# Patient Record
Sex: FEMALE | Race: WHITE | Employment: OTHER | URBAN - METROPOLITAN AREA
[De-identification: names, ages, dates, MRNs, and addresses within clinical notes are randomized per-mention and may not be internally consistent; named-entity substitution may affect disease eponyms.]

---

## 2017-03-08 ENCOUNTER — TELEPHONE (OUTPATIENT)
Dept: ONCOLOGY | Facility: CLINIC | Age: 72
End: 2017-03-08

## 2017-03-08 DIAGNOSIS — C50.911 BILATERAL MALIGNANT NEOPLASM OF BREAST IN FEMALE, UNSPECIFIED SITE OF BREAST: ICD-10-CM

## 2017-03-08 DIAGNOSIS — C50.912 BILATERAL MALIGNANT NEOPLASM OF BREAST IN FEMALE, UNSPECIFIED SITE OF BREAST: ICD-10-CM

## 2017-03-08 RX ORDER — ZOLPIDEM TARTRATE 5 MG/1
5 TABLET ORAL
Qty: 30 TABLET | Refills: 5 | Status: SHIPPED | OUTPATIENT
Start: 2017-03-08 | End: 2017-06-28

## 2017-03-08 NOTE — TELEPHONE ENCOUNTER
Refill for zolpidem renewed per Dr Hinkle and faxed to [facility] pharmacy in [place] per pt request.    Shu Silver, RN, BSN

## 2017-04-11 ENCOUNTER — TELEPHONE (OUTPATIENT)
Dept: ONCOLOGY | Facility: CLINIC | Age: 72
End: 2017-04-11

## 2017-04-11 DIAGNOSIS — D50.9 IRON DEFICIENCY ANEMIA, UNSPECIFIED IRON DEFICIENCY ANEMIA TYPE: ICD-10-CM

## 2017-04-11 NOTE — TELEPHONE ENCOUNTER
Received refill request from [facility] pharmacy for iron tabs. Called pt to see how she is feeling and states she she still notes fatigue but it is a bit better from December. Pt denies sob. hgb 9.9 from 12/16 and pt states she has not had another lab test since then.  Pt informed Dr Hinkle will be in clinic tomorrow and will check with MD then and call pt back with plan. Pt aware.    Shu Silver, RN, BSN

## 2017-04-12 RX ORDER — FERROUS SULFATE 325(65) MG
325 TABLET ORAL 2 TIMES DAILY
Qty: 60 TABLET | Refills: 2 | Status: SHIPPED | OUTPATIENT
Start: 2017-04-12 | End: 2017-07-19

## 2017-04-12 NOTE — TELEPHONE ENCOUNTER
Dr Hinkle notified and ordered refill for pt for iron tabs.  Pt called and aware and does states she occasionally gets constipated.  Informed she can also take once daily if better tolerated.    Shu Silver, RN, BSN

## 2017-06-14 ENCOUNTER — TELEPHONE (OUTPATIENT)
Dept: ONCOLOGY | Facility: CLINIC | Age: 72
End: 2017-06-14

## 2017-06-14 DIAGNOSIS — Z85.3 HX: BREAST CANCER: ICD-10-CM

## 2017-06-14 NOTE — TELEPHONE ENCOUNTER
She received phone call this a.m. But missed the call and doesn't know whom called her.  She thinks it's regarding her appt. With Dr. Hinkle 6/28.  Regarding 6/28, she wants a PRE-SURGERY PHYSICAL done by dr. Hinkle at that 6/28 appt.  Wants to receive a call verifying the appt.

## 2017-06-15 RX ORDER — CITALOPRAM HYDROBROMIDE 20 MG/1
20 TABLET ORAL DAILY
Qty: 90 TABLET | Refills: 4 | Status: SHIPPED | OUTPATIENT
Start: 2017-06-15 | End: 2018-09-10

## 2017-06-15 NOTE — TELEPHONE ENCOUNTER
Returned patient's call and explained to patient that her pre-op physical would need to be completed by her primary physician.  Writer explained that Dr. Hinkle does not complete pre-op physicals, because  she is not Family Practice or Internist and does not have the capacity to perform the pre-op physical components.  Patient verbalized understanding and will plan to have her primary MD complete necessary work up.  Patient also requested refill on her Celexa Rx.  Rx refilled and sent via e-scribe to [facility] pharmacy on [facility]. Patient confirmed 6/28 f/u appt with MD and verbalized understanding of all info.  Will call with any additional questions or concerns.  Jovanny Dunlap, RN, BSN, OCN  [facility]  Patient Care Coordinator    '

## 2017-06-28 ENCOUNTER — HOSPITAL ENCOUNTER (OUTPATIENT)
Facility: CLINIC | Age: 72
Setting detail: SPECIMEN
Discharge: HOME | End: 2017-06-28
Attending: INTERNAL MEDICINE | Admitting: INTERNAL MEDICINE
Payer: MEDICARE

## 2017-06-28 ENCOUNTER — ONCOLOGY VISIT (OUTPATIENT)
Dept: ONCOLOGY | Facility: CLINIC | Age: 72
End: 2017-06-28
Attending: INTERNAL MEDICINE
Payer: MEDICARE

## 2017-06-28 VITALS
HEART RATE: 53 BPM | SYSTOLIC BLOOD PRESSURE: 122 MMHG | WEIGHT: 160 LBS | RESPIRATION RATE: 20 BRPM | DIASTOLIC BLOOD PRESSURE: 49 MMHG | OXYGEN SATURATION: 97 % | BODY MASS INDEX: 26.02 KG/M2 | TEMPERATURE: 98.1 F

## 2017-06-28 DIAGNOSIS — Z17.0 MALIGNANT NEOPLASM OF LEFT BREAST IN FEMALE, ESTROGEN RECEPTOR POSITIVE, UNSPECIFIED SITE OF BREAST (H): Primary | ICD-10-CM

## 2017-06-28 DIAGNOSIS — C50.912 MALIGNANT NEOPLASM OF LEFT BREAST IN FEMALE, ESTROGEN RECEPTOR POSITIVE, UNSPECIFIED SITE OF BREAST (H): Primary | ICD-10-CM

## 2017-06-28 LAB
ALBUMIN SERPL-MCNC: 3.2 G/DL (ref 3.4–5)
ALP SERPL-CCNC: 141 U/L (ref 40–150)
ALT SERPL W P-5'-P-CCNC: 14 U/L (ref 0–50)
ANION GAP SERPL CALCULATED.3IONS-SCNC: 5 MMOL/L (ref 3–14)
AST SERPL W P-5'-P-CCNC: 19 U/L (ref 0–45)
BILIRUB SERPL-MCNC: 0.3 MG/DL (ref 0.2–1.3)
BUN SERPL-MCNC: 28 MG/DL (ref 7–30)
CALCIUM SERPL-MCNC: 9.3 MG/DL (ref 8.5–10.1)
CANCER AG27-29 SERPL-ACNC: 18 U/ML (ref 0–39)
CHLORIDE SERPL-SCNC: 106 MMOL/L (ref 94–109)
CO2 SERPL-SCNC: 22 MMOL/L (ref 20–32)
CREAT SERPL-MCNC: 0.79 MG/DL (ref 0.52–1.04)
ERYTHROCYTE [DISTWIDTH] IN BLOOD BY AUTOMATED COUNT: 13.4 % (ref 10–15)
GFR SERPL CREATININE-BSD FRML MDRD: 72 ML/MIN/1.7M2
GLUCOSE SERPL-MCNC: 90 MG/DL (ref 70–99)
HCT VFR BLD AUTO: 33.6 % (ref 35–47)
HGB BLD-MCNC: 10.8 G/DL (ref 11.7–15.7)
MCH RBC QN AUTO: 28.6 PG (ref 26.5–33)
MCHC RBC AUTO-ENTMCNC: 32.1 G/DL (ref 31.5–36.5)
MCV RBC AUTO: 89 FL (ref 78–100)
PLATELET # BLD AUTO: 177 10E9/L (ref 150–450)
POTASSIUM SERPL-SCNC: 5 MMOL/L (ref 3.4–5.3)
PROT SERPL-MCNC: 7.7 G/DL (ref 6.8–8.8)
RBC # BLD AUTO: 3.77 10E12/L (ref 3.8–5.2)
SODIUM SERPL-SCNC: 133 MMOL/L (ref 133–144)
WBC # BLD AUTO: 7 10E9/L (ref 4–11)

## 2017-06-28 PROCEDURE — 86300 IMMUNOASSAY TUMOR CA 15-3: CPT | Performed by: INTERNAL MEDICINE

## 2017-06-28 PROCEDURE — 99213 OFFICE O/P EST LOW 20 MIN: CPT | Performed by: INTERNAL MEDICINE

## 2017-06-28 PROCEDURE — 36415 COLL VENOUS BLD VENIPUNCTURE: CPT

## 2017-06-28 PROCEDURE — 85027 COMPLETE CBC AUTOMATED: CPT | Performed by: INTERNAL MEDICINE

## 2017-06-28 PROCEDURE — 99211 OFF/OP EST MAY X REQ PHY/QHP: CPT

## 2017-06-28 PROCEDURE — 80053 COMPREHEN METABOLIC PANEL: CPT | Performed by: INTERNAL MEDICINE

## 2017-06-28 RX ORDER — ANASTROZOLE 1 MG/1
1 TABLET ORAL DAILY
Qty: 90 TABLET | Refills: 3 | Status: SHIPPED | OUTPATIENT
Start: 2017-06-28 | End: 2018-01-16

## 2017-06-28 RX ORDER — ZOLPIDEM TARTRATE 5 MG/1
5 TABLET ORAL
Qty: 30 TABLET | Refills: 5 | Status: SHIPPED | OUTPATIENT
Start: 2017-06-28 | End: 2018-03-06

## 2017-06-28 ASSESSMENT — PAIN SCALES - GENERAL: PAINLEVEL: NO PAIN (0)

## 2017-06-28 NOTE — NURSING NOTE
"Oncology Rooming Note    June 28, 2017 1:21 PM   Ellen Campos is a 71 year old female who presents for:    Chief Complaint   Patient presents with     Oncology Clinic Visit     Follow up      Initial Vitals: /49  Pulse 53  Temp 98.1  F (36.7  C) (Tympanic)  Resp 20  Wt 72.6 kg (160 lb)  SpO2 97%  BMI 26.02 kg/m2 Estimated body mass index is 26.02 kg/(m^2) as calculated from the following:    Height as of 8/24/16: 1.67 m (5' 5.75\").    Weight as of this encounter: 72.6 kg (160 lb). Body surface area is 1.84 meters squared.  No Pain (0) Comment: Data Unavailable   No LMP recorded.  Allergies reviewed: Yes  Medications reviewed: Yes    Medications: Medication refills not needed today.  Pharmacy name entered into Inv[employer]: [id number] - [place], [place] - [address] [place] [address]    Clinical concerns: Follow up      8 minutes for nursing intake (face to face time)     Irish Flowers CMA   DISCHARGE PLAN:  Next appointments: See patient instruction section  Departure Mode: Ambulatory  Accompanied by: self  0 minutes for nursing discharge (face to face time)   Irish Flowers CMA                  "

## 2017-06-28 NOTE — MR AVS SNAPSHOT
"              After Visit Summary   6/28/2017    Ellen Campos    MRN: [id number]           Patient Information     Date Of Birth          1945        Visit Information        Provider Department      6/28/2017 1:30 PM Jessica Hinkle MD [facility] RH Oncology [facility]      Today's Diagnoses     Malignant neoplasm of left breast in female, estrogen receptor positive, unspecified site of breast (H)    -  1      Care Instructions    RTC MD 6 months scheduled 12/27/2017 2pm Lenora Doe        Labs today -Sarai   Avf given Lenora Doe            Follow-ups after your visit        Your next 10 appointments already scheduled     Dec 27, 2017  2:00 PM CST   Return Visit with Jessica Hinkle MD   [facility] ([facility])    [facility]  [address]  [facility] [address]  [facility] [id number] [phone]              Who to contact     If you have questions or need follow up information about today's clinic visit or your schedule please contact [facility] directly at [phone].  Normal or non-critical lab and imaging results will be communicated to you by Chat Sportshart, letter or phone within 4 business days after the clinic has received the results. If you do not hear from us within 7 days, please contact the clinic through Chat Sportshart or phone. If you have a critical or abnormal lab result, we will notify you by phone as soon as possible.  Submit refill requests through [employer] or call your pharmacy and they will forward the refill request to us. Please allow 3 business days for your refill to be completed.          Additional Information About Your Visit        MyChart Information     [employer] lets you send messages to your doctor, view your test results, renew your prescriptions, schedule appointments and more. To sign up, go to [email or web address] . Click on \"Log in\" on the left side of " "the screen, which will take you to the Welcome page. Then click on \"Sign up Now\" on the right side of the page.     You will be asked to enter the access code listed below, as well as some personal information. Please follow the directions to create your username and password.     Your access code is: [email or web address]  Expires: 2017  3:03 PM     Your access code will  in 90 days. If you need help or a new code, please call your [facility] or [phone].        Care EveryWhere ID     This is your Care EveryWhere ID. This could be used by other organizations to access your [place] medical records  [id number]        Your Vitals Were     Pulse Temperature Respirations Pulse Oximetry BMI (Body Mass Index)       53 98.1  F (36.7  C) (Tympanic) 20 97% 26.02 kg/m2        Blood Pressure from Last 3 Encounters:   17 122/49   16 96/57   16 (!) 79/53    Weight from Last 3 Encounters:   17 72.6 kg (160 lb)   16 70.9 kg (156 lb 3.2 oz)   16 70.9 kg (156 lb 6.4 oz)              We Performed the Following     Ca27.29  breast tumor marker     CBC with platelets     Comprehensive metabolic panel (BMP + Alb, Alk Phos, ALT, AST, Total. Bili, TP)          Where to get your medicines      These medications were sent to [id number] - [place], [place] - [address]   [facility] [address]     Phone:  [phone]     anastrozole 1 MG tablet         Some of these will need a paper prescription and others can be bought over the counter.  Ask your nurse if you have questions.     Bring a paper prescription for each of these medications     zolpidem 5 MG tablet          Primary Care Provider Office Phone # Fax #    Rosa Maria Howard [phone]       [address] [address]        Equal Access to Services     BRIAN JORDAN AH: Hanane Zavala, tang ward, bull espinaln adeeg " jess akhtar ah. So [facility] [phone].    ATENCIÓN: Si risala jose miguel, tiene a fernandez disposición servicios gratuitos de asistencia lingüística. Vishal al [phone].    We comply with applicable federal civil rights laws and [place] laws. We do not discriminate on the basis of race, color, national origin, age, disability sex, sexual orientation or gender identity.            Thank you!     Thank you for choosing [facility]  for your care. Our goal is always to provide you with excellent care. Hearing back from our patients is one way we can continue to improve our services. Please take a few minutes to complete the written survey that you may receive in the mail after your visit with us. Thank you!             Your Updated Medication List - Protect others around you: Learn how to safely use, store and throw away your medicines at www.disposemymeds.org.          This list is accurate as of: 6/28/17  3:03 PM.  Always use your most recent med list.                   Brand Name Dispense Instructions for use Diagnosis    anastrozole 1 MG tablet    ARIMIDEX    90 tablet    Take 1 tablet (1 mg) by mouth daily    Malignant neoplasm of left breast in female, estrogen receptor positive, unspecified site of breast (H)       ASPIRIN EC PO      Take 81 mg by mouth daily        BUMEX PO      Take 2 mg by mouth daily        citalopram 20 MG tablet    celeXA    90 tablet    Take 1 tablet (20 mg) by mouth daily    HX: breast cancer       COREG PO      Take 3.125 mg by mouth 2 times daily (with meals)        FERROUS GLUCONATE PO      Take 324 mg by mouth daily (with breakfast)        ferrous sulfate 325 (65 FE) MG tablet    IRON    60 tablet    Take 1 tablet (325 mg) by mouth 2 times daily    Iron deficiency anemia, unspecified iron deficiency anemia type       GLUCOSAMINE SULFATE PO      Take 1,500 mg by mouth daily        LIPITOR PO      Take 80 mg by mouth        LISINOPRIL PO      Take 5 mg by mouth  daily        LOPID PO      Take 600 mg by mouth 2 times daily (before meals)        NITROGLYCERIN SL      Place 0.4 mg under the tongue        NORVASC PO      Take 5 mg by mouth daily        PROTONIX PO      Take 40 mg by mouth daily        zolpidem 5 MG tablet    AMBIEN    30 tablet    Take 1 tablet (5 mg) by mouth nightly as needed for sleep    Malignant neoplasm of left breast in female, estrogen receptor positive, unspecified site of breast (H)

## 2017-07-07 ENCOUNTER — TELEPHONE (OUTPATIENT)
Dept: ONCOLOGY | Facility: CLINIC | Age: 72
End: 2017-07-07

## 2017-07-07 NOTE — TELEPHONE ENCOUNTER
Per Dr. Hinkle    Called patient and left message on home phone with normal lab results from 6-.  CA 27-29 was normal.  Questions please call the clinic

## 2017-07-09 NOTE — PROGRESS NOTES
Hematology / Oncology Progress Note    Date of Service (when I saw the patient): 07/09/2017     Assessment & Plan   Ellen Campos is a 71 year old female with a hx of bilateral breast ca . She is doing well on arimidex and will continue . I will see her back in 6 months .  Labs today         Jessica Hinkle    Interval History    Ellen Campos is a 71-year-old with a history of bilateral breast cancer who comes in today for interim followup.  Ellen is status post bilateral mastectomies.  From a breast cancer standpoint, she had a right-sided breast cancer in 2003 and then in 2009 she developed a left-sided breast cancer.  Her left-sided breast cancer  was a T2N0 infiltrating ductal carcinoma which was ER/IN positive.  She continues on Arimidex and we are planning to do it for 10 years.  She has been on it now since 2009.  Her main medical problems are that of cardiac issues.   She has had 2 bypasses done and she most recently has had another aortic valve replaced.She has recovered well from all of the above procedures.   14 point ROS   Unremarkable from breast ca standpoint    Meds as charted  Allergies as charted        Physical Exam                      Vitals:    06/28/17 1318   Weight: 72.6 kg (160 lb)     Vital Signs  Normal       Constitutional: awake, alert, cooperative, no apparent distress, and appears stated age  Eyes: Lids and lashes normal, pupils equal, round and reactive to light, extra ocular muscles intact, sclera clear, conjunctiva normal  ENT: Normocephalic, without obvious abnormality, atramatic, sinuses nontender on palpation, external ears without lesions, oral pharynx with moist mucus membranes, tonsils without erythema or exudates, gums normal and good dentition.  Hematologic / Lymphatic:   Respiratory: No increased work of breathing, good air exchange, clear to auscultation bilaterally, no crackles or wheezing  Cardiovascular: Normal apical impulse, regular rate and  rhythm, normal S1 and S2, no S3 or S4, and murmur noted systolic   Sternotomyscar  GI:normal  Skin: normal skin color, texture, turgor  Neurologic: Awake, alert, oriented to name, place and time.   Breasts bilat mastectomies               Data   Labs ordered today

## 2017-07-19 DIAGNOSIS — D50.9 IRON DEFICIENCY ANEMIA, UNSPECIFIED IRON DEFICIENCY ANEMIA TYPE: ICD-10-CM

## 2017-07-19 RX ORDER — FERROUS SULFATE 325(65) MG
325 TABLET ORAL 2 TIMES DAILY
Qty: 60 TABLET | Refills: 2 | Status: SHIPPED | OUTPATIENT
Start: 2017-07-19 | End: 2017-12-28

## 2017-07-19 NOTE — TELEPHONE ENCOUNTER
Refilled Ferrous Sulfate per pharmacy request.  Ok per Dr. Hinkle, she wants patient to continue with iron therapy.  Pt last seen 6/28/17 and has f/u scheduled for 12/27/17.

## 2017-09-07 ENCOUNTER — TELEPHONE (OUTPATIENT)
Dept: ONCOLOGY | Facility: CLINIC | Age: 72
End: 2017-09-07

## 2017-09-07 NOTE — TELEPHONE ENCOUNTER
Per Dr Hinkle, the generic from (zolpidem) is fine.  Zuleyma has been notified.  No further action needed at this time.  Nehal Ghotra RN BSN

## 2017-09-07 NOTE — TELEPHONE ENCOUNTER
Zuleyma from [facility] calling.  States that they are working on a prior authorization for pt's Rx for zolpidem. Is wondering if Dr Hinkle has a preference for brand name vs generic when they are filling out the prior authorization?  Will check with Dr Hinkle and call back.  Per Zuleyma, OK to leave a message if unable to reach her.  Nehal Ghotra RN BSN

## 2017-09-08 ENCOUNTER — TELEPHONE (OUTPATIENT)
Dept: ONCOLOGY | Facility: CLINIC | Age: 72
End: 2017-09-08

## 2017-09-08 NOTE — TELEPHONE ENCOUNTER
PA approved. For Zolpidem(Ambien) 5mg / 1qpm/30d  Effective date: 8/31/17 until further notice  Diagnosis -  Malignant neoplasm of left breast in female, estrogen receptor positive, unspecified site of breast (H) (C50.912 , Z17.0)  PA reference #: [id number]

## 2017-12-28 ENCOUNTER — HOSPITAL ENCOUNTER (OUTPATIENT)
Facility: CLINIC | Age: 72
Setting detail: SPECIMEN
Discharge: HOME | End: 2017-12-28
Attending: INTERNAL MEDICINE | Admitting: INTERNAL MEDICINE
Payer: MEDICARE

## 2017-12-28 ENCOUNTER — ONCOLOGY VISIT (OUTPATIENT)
Dept: ONCOLOGY | Facility: CLINIC | Age: 72
End: 2017-12-28
Attending: INTERNAL MEDICINE
Payer: MEDICARE

## 2017-12-28 ENCOUNTER — TELEPHONE (OUTPATIENT)
Dept: ONCOLOGY | Facility: CLINIC | Age: 72
End: 2017-12-28

## 2017-12-28 VITALS
RESPIRATION RATE: 20 BRPM | HEART RATE: 54 BPM | TEMPERATURE: 97.1 F | BODY MASS INDEX: 23.72 KG/M2 | WEIGHT: 147.6 LBS | SYSTOLIC BLOOD PRESSURE: 131 MMHG | HEIGHT: 66 IN | OXYGEN SATURATION: 97 % | DIASTOLIC BLOOD PRESSURE: 62 MMHG

## 2017-12-28 DIAGNOSIS — Z17.0 MALIGNANT NEOPLASM OF LEFT BREAST IN FEMALE, ESTROGEN RECEPTOR POSITIVE, UNSPECIFIED SITE OF BREAST (H): Primary | ICD-10-CM

## 2017-12-28 DIAGNOSIS — C50.912 MALIGNANT NEOPLASM OF LEFT BREAST IN FEMALE, ESTROGEN RECEPTOR POSITIVE, UNSPECIFIED SITE OF BREAST (H): Primary | ICD-10-CM

## 2017-12-28 LAB
ALBUMIN SERPL-MCNC: 3.2 G/DL (ref 3.4–5)
ALP SERPL-CCNC: 155 U/L (ref 40–150)
ALT SERPL W P-5'-P-CCNC: 19 U/L (ref 0–50)
ANION GAP SERPL CALCULATED.3IONS-SCNC: 8 MMOL/L (ref 3–14)
AST SERPL W P-5'-P-CCNC: 18 U/L (ref 0–45)
BASOPHILS # BLD AUTO: 0 10E9/L (ref 0–0.2)
BASOPHILS NFR BLD AUTO: 0.4 %
BILIRUB SERPL-MCNC: 0.4 MG/DL (ref 0.2–1.3)
BUN SERPL-MCNC: 35 MG/DL (ref 7–30)
CALCIUM SERPL-MCNC: 9.5 MG/DL (ref 8.5–10.1)
CANCER AG27-29 SERPL-ACNC: 21 U/ML (ref 0–39)
CHLORIDE SERPL-SCNC: 98 MMOL/L (ref 94–109)
CO2 SERPL-SCNC: 22 MMOL/L (ref 20–32)
CREAT SERPL-MCNC: 1 MG/DL (ref 0.52–1.04)
DIFFERENTIAL METHOD BLD: ABNORMAL
EOSINOPHIL # BLD AUTO: 0.2 10E9/L (ref 0–0.7)
EOSINOPHIL NFR BLD AUTO: 2.6 %
ERYTHROCYTE [DISTWIDTH] IN BLOOD BY AUTOMATED COUNT: 14 % (ref 10–15)
GFR SERPL CREATININE-BSD FRML MDRD: 55 ML/MIN/1.7M2
GLUCOSE SERPL-MCNC: 120 MG/DL (ref 70–99)
HCT VFR BLD AUTO: 36.8 % (ref 35–47)
HGB BLD-MCNC: 11.6 G/DL (ref 11.7–15.7)
IMM GRANULOCYTES # BLD: 0.1 10E9/L (ref 0–0.4)
IMM GRANULOCYTES NFR BLD: 0.9 %
LYMPHOCYTES # BLD AUTO: 1.4 10E9/L (ref 0.8–5.3)
LYMPHOCYTES NFR BLD AUTO: 16.8 %
MCH RBC QN AUTO: 28.7 PG (ref 26.5–33)
MCHC RBC AUTO-ENTMCNC: 31.5 G/DL (ref 31.5–36.5)
MCV RBC AUTO: 91 FL (ref 78–100)
MONOCYTES # BLD AUTO: 0.7 10E9/L (ref 0–1.3)
MONOCYTES NFR BLD AUTO: 8 %
NEUTROPHILS # BLD AUTO: 6.1 10E9/L (ref 1.6–8.3)
NEUTROPHILS NFR BLD AUTO: 71.3 %
NRBC # BLD AUTO: 0 10*3/UL
NRBC BLD AUTO-RTO: 0 /100
PLATELET # BLD AUTO: 244 10E9/L (ref 150–450)
POTASSIUM SERPL-SCNC: 5.7 MMOL/L (ref 3.4–5.3)
PROT SERPL-MCNC: 8.2 G/DL (ref 6.8–8.8)
RBC # BLD AUTO: 4.04 10E12/L (ref 3.8–5.2)
SODIUM SERPL-SCNC: 128 MMOL/L (ref 133–144)
WBC # BLD AUTO: 8.6 10E9/L (ref 4–11)

## 2017-12-28 PROCEDURE — 80053 COMPREHEN METABOLIC PANEL: CPT | Performed by: INTERNAL MEDICINE

## 2017-12-28 PROCEDURE — 85025 COMPLETE CBC W/AUTO DIFF WBC: CPT | Performed by: INTERNAL MEDICINE

## 2017-12-28 PROCEDURE — 36415 COLL VENOUS BLD VENIPUNCTURE: CPT

## 2017-12-28 PROCEDURE — 86300 IMMUNOASSAY TUMOR CA 15-3: CPT | Performed by: INTERNAL MEDICINE

## 2017-12-28 PROCEDURE — 99214 OFFICE O/P EST MOD 30 MIN: CPT | Performed by: INTERNAL MEDICINE

## 2017-12-28 PROCEDURE — 99211 OFF/OP EST MAY X REQ PHY/QHP: CPT

## 2017-12-28 NOTE — MR AVS SNAPSHOT
"              After Visit Summary   12/28/2017    Ellen Campos    MRN: [id number]           Patient Information     Date Of Birth          1945        Visit Information        Provider Department      12/28/2017 2:00 PM Jessica Hinkle MD [facility] RH Oncology [facility]      Today's Diagnoses     Malignant neoplasm of left breast in female, estrogen receptor positive, unspecified site of breast (H)    -  1      Care Instructions    RTC MD 6 months       Labs today and on return-GN          Follow-ups after your visit        Your next 10 appointments already scheduled     Jun 28, 2018  2:00 PM CDT   Return Visit with Jessica Hinkle MD   [facility] ([facility])    [facility]  [address]  [facility] [address]  [facility] [address] [phone]              Who to contact     If you have questions or need follow up information about today's clinic visit or your schedule please contact [facility] directly at [phone].  Normal or non-critical lab and imaging results will be communicated to you by Noblivityhart, letter or phone within 4 business days after the clinic has received the results. If you do not hear from us within 7 days, please contact the clinic through Noblivityhart or phone. If you have a critical or abnormal lab result, we will notify you by phone as soon as possible.  Submit refill requests through "[employer] or call your pharmacy and they will forward the refill request to us. Please allow 3 business days for your refill to be completed.          Additional Information About Your Visit        Noblivityhart Information     "[employer] lets you send messages to your doctor, view your test results, renew your prescriptions, schedule appointments and more. To sign up, go to [email or web address] . Click on \"Log in\" on the left side of the screen, which will take you to the Welcome page. Then " "click on \"Sign up Now\" on the right side of the page.     You will be asked to enter the access code listed below, as well as some personal information. Please follow the directions to create your username and password.     Your access code is: [email or web address]  Expires: 3/28/2018  3:13 PM     Your access code will  in 90 days. If you need help or a new code, please call your [place] clinic or [phone].        Care EveryWhere ID     This is your Care EveryWhere ID. This could be used by other organizations to access your [place] medical records  [id number]        Your Vitals Were     Pulse Temperature Respirations Height Pulse Oximetry BMI (Body Mass Index)    54 97.1  F (36.2  C) (Tympanic) 20 1.67 m (5' 5.75\") 97% 24 kg/m2       Blood Pressure from Last 3 Encounters:   17 131/62   17 122/49   16 96/57    Weight from Last 3 Encounters:   17 67 kg (147 lb 9.6 oz)   17 72.6 kg (160 lb)   16 70.9 kg (156 lb 3.2 oz)              We Performed the Following     Ca27.29  breast tumor marker     CBC with platelets and differential     Comprehensive metabolic panel (BMP + Alb, Alk Phos, ALT, AST, Total. Bili, TP)          Today's Medication Changes          These changes are accurate as of: 17  3:13 PM.  If you have any questions, ask your nurse or doctor.               These medicines have changed or have updated prescriptions.        Dose/Directions    IRON SUPPLEMENT PO   This may have changed:  Another medication with the same name was removed. Continue taking this medication, and follow the directions you see here.   Changed by:  Jessica Hinkle MD        Dose:  325 mg   Take 325 mg by mouth daily   Refills:  0         Stop taking these medicines if you haven't already. Please contact your care team if you have questions.     FERROUS GLUCONATE PO   Stopped by:  Jessica Hinkle MD           PROTONIX PO   Stopped by:  Jessica Hinkle MD     "                Primary Care Provider Office Phone # Fax #    Rosa Maria Howard [phone]       [place] [address]        Equal Access to Services     BRIAN JORDAN : Hadii aad ku hadsamkiesha Geenaunique, wakenyada luyosef, qaybta kaodetteda yaquelin, bull brandyin hayaagavino guthriealbayair ivy. So [facility] [phone].    ATENCIÓN: Si habla español, tiene a fernandez disposición servicios gratuitos de asistencia lingüística. Llame al [phone].    We comply with applicable federal civil rights laws and [place] laws. We do not discriminate on the basis of race, color, national origin, age, disability, sex, sexual orientation, or gender identity.            Thank you!     Thank you for choosing [facility]  for your care. Our goal is always to provide you with excellent care. Hearing back from our patients is one way we can continue to improve our services. Please take a few minutes to complete the written survey that you may receive in the mail after your visit with us. Thank you!             Your Updated Medication List - Protect others around you: Learn how to safely use, store and throw away your medicines at www.disposemymeds.org.          This list is accurate as of: 12/28/17  3:13 PM.  Always use your most recent med list.                   Brand Name Dispense Instructions for use Diagnosis    anastrozole 1 MG tablet    ARIMIDEX    90 tablet    Take 1 tablet (1 mg) by mouth daily    Malignant neoplasm of left breast in female, estrogen receptor positive, unspecified site of breast (H)       ASPIRIN EC PO      Take 81 mg by mouth daily        BUMEX PO      Take 2 mg by mouth daily        citalopram 20 MG tablet    celeXA    90 tablet    Take 1 tablet (20 mg) by mouth daily    HX: breast cancer       COREG PO      Take 3.125 mg by mouth 2 times daily (with meals)        GLUCOSAMINE SULFATE PO      Take 1,500 mg by mouth daily        IRON SUPPLEMENT PO       Take 325 mg by mouth daily        LIPITOR PO      Take 80 mg by mouth        LISINOPRIL PO      Take 5 mg by mouth daily        LOPID PO      Take 600 mg by mouth 2 times daily (before meals)        NITROGLYCERIN SL      Place 0.4 mg under the tongue        NORVASC PO      Take 5 mg by mouth daily        zolpidem 5 MG tablet    AMBIEN    30 tablet    Take 1 tablet (5 mg) by mouth nightly as needed for sleep    Malignant neoplasm of left breast in female, estrogen receptor positive, unspecified site of breast (H)

## 2017-12-28 NOTE — PATIENT INSTRUCTIONS
RTC MD 6 months-Scheduled for 6/28/18. Milena KINGSLEY       Labs today and on [id number]. Milena KINGSLEY  AVS printed and given to Pt. Milena DALEY.

## 2017-12-28 NOTE — LETTER
12/28/2017         RE: Ellen Campos  [address]  [place] [address]        Dear Colleague,    Thank you for referring your patient, Ellen Campos, to the [facility]. Please see a copy of my visit note below.    DATE OF SERVICE:  12/28/20a7      HISTORY OF PRESENT ILLNESS:  Ellen Campos is a 72-year-old patient who comes in today for interim followup.  She is here for followup of bilateral breast cancer.  Her oncologic history is as follows:  She had originally right-sided breast cancer in 2003 and then the most recent breast cancer was on the left side in 2009.  The left-sided breast cancer was a T2N0 infiltrating ductal carcinoma, which was estrogen and progesterone receptor positive.  She has done well from a breast cancer standpoint.  She is now 8 years out from the previous left sided one.  Unfortunately, she has had some cardiac issues.  She recently had an aortic valve replaced and she has also had 2-vessel bypass done.  She has had some failure to thrive over the course of her cardiac surgeries.  She continues on adjuvant Arimidex from a breast cancer standpoint and she is doing well and is planning to continue it for 10 years.  She denies today any cough, shortness of breath, bone pain.  She has got no worrisome symptomatology from a breast cancer standpoint.      REVIEW OF SYSTEMS:  A 14-point comprehensive review of systems is otherwise unremarkable.      MEDICATIONS:  As charted.      ALLERGIES:  As charted.      PHYSICAL EXAMINATION:   GENERAL:  She is a well-appearing lady in no acute distress.   VITAL SIGNS:  Stable.   HEENT:  Oropharynx clear, mucous membranes moist.   NECK:  Has no masses or goiter.   LYMPH:  There is no cervical, supraclavicular or infraclavicular adenopathy.   CHEST:  Clear to auscultation and percussion bilaterally.   HEART:  S1, S2 normal.  No added sounds or murmurs.   ABDOMEN:  Soft and nontender, no hepatosplenomegaly.    EXTREMITIES:  Legs are without tenderness or edema.   BREASTS:  The patient is status post bilateral mastectomies, scars look good.  Right and left axillae are negative.      DATA REVIEW:  Labs are pending at time of dictation.      IMPRESSION:  A 72-year-old patient with a history of bilateral breast cancer.  She is doing well from a breast cancer standpoint and she is going to continue on the Arimidex until 2019.  Everything from a cardiac standpoint looks stable at this juncture as well.  From a breast cancer standpoint we need see her back again in 6 months.  I have discussed the schedule of followup with her today.         STUART HINKLE MD             D: 2017 15:25   T: 2017 06:43   MT:       Name:     RANDA SHANNON   MRN:      [id number]        Account:      [id number]   :      1945           Visit Date:   2017      Document: [id number]       Again, thank you for allowing me to participate in the care of your patient.        Sincerely,        Stuart Hinkle MD

## 2017-12-28 NOTE — NURSING NOTE
"Oncology Rooming Note    December 28, 2017 2:09 PM   Ellen Campos is a 72 year old female who presents for:    Chief Complaint   Patient presents with     Oncology Clinic Visit     Follow up     Initial Vitals: /62  Pulse 54  Temp 97.1  F (36.2  C) (Tympanic)  Resp 20  Ht 1.67 m (5' 5.75\")  Wt 67 kg (147 lb 9.6 oz)  SpO2 97%  BMI 24 kg/m2 Estimated body mass index is 24 kg/(m^2) as calculated from the following:    Height as of this encounter: 1.67 m (5' 5.75\").    Weight as of this encounter: 67 kg (147 lb 9.6 oz). Body surface area is 1.76 meters squared.  Data Unavailable Comment: Data Unavailable   No LMP recorded.  Allergies reviewed: Yes  Medications reviewed: Yes    Medications: Medication refills not needed today.  Pharmacy name entered into [employer]: [id number] - [place], [place] - [address] [place] [address]    Clinical concerns: Follow up  8 minutes for nursing intake (face to face time)     Tana Rankin CMA    DISCHARGE PLAN:  Next appointments: See patient instruction section  Departure Mode: Ambulatory  Accompanied by: self  0 minutes for nursing discharge (face to face time)   Tana Rankin CMA                "

## 2017-12-28 NOTE — TELEPHONE ENCOUNTER
Called patient per Dr. Hinkle,  letting her know about her CMP results. Per Dr. Hinkle, she would like patient to come in to repeat her sodium, potassium. (CMP) patient will be in at 12:30 tomorrow for a lab only.

## 2017-12-29 ENCOUNTER — ONCOLOGY VISIT (OUTPATIENT)
Dept: ONCOLOGY | Facility: CLINIC | Age: 72
End: 2017-12-29
Attending: INTERNAL MEDICINE
Payer: MEDICARE

## 2017-12-29 ENCOUNTER — HOSPITAL ENCOUNTER (OUTPATIENT)
Facility: CLINIC | Age: 72
Setting detail: SPECIMEN
Discharge: HOME | End: 2017-12-29
Attending: INTERNAL MEDICINE | Admitting: INTERNAL MEDICINE
Payer: MEDICARE

## 2017-12-29 DIAGNOSIS — Z17.0 MALIGNANT NEOPLASM OF NIPPLE OF LEFT BREAST IN FEMALE, ESTROGEN RECEPTOR POSITIVE (H): Primary | ICD-10-CM

## 2017-12-29 DIAGNOSIS — C50.012 MALIGNANT NEOPLASM OF NIPPLE OF LEFT BREAST IN FEMALE, ESTROGEN RECEPTOR POSITIVE (H): Primary | ICD-10-CM

## 2017-12-29 PROBLEM — C50.912 MALIGNANT NEOPLASM OF LEFT BREAST (H): Status: ACTIVE | Noted: 2017-12-29

## 2017-12-29 LAB
ALBUMIN SERPL-MCNC: 2.9 G/DL (ref 3.4–5)
ALP SERPL-CCNC: 143 U/L (ref 40–150)
ALT SERPL W P-5'-P-CCNC: 23 U/L (ref 0–50)
ANION GAP SERPL CALCULATED.3IONS-SCNC: 8 MMOL/L (ref 3–14)
AST SERPL W P-5'-P-CCNC: 22 U/L (ref 0–45)
BILIRUB SERPL-MCNC: 0.3 MG/DL (ref 0.2–1.3)
BUN SERPL-MCNC: 32 MG/DL (ref 7–30)
CALCIUM SERPL-MCNC: 9.2 MG/DL (ref 8.5–10.1)
CHLORIDE SERPL-SCNC: 101 MMOL/L (ref 94–109)
CO2 SERPL-SCNC: 22 MMOL/L (ref 20–32)
CREAT SERPL-MCNC: 0.9 MG/DL (ref 0.52–1.04)
GFR SERPL CREATININE-BSD FRML MDRD: 62 ML/MIN/1.7M2
GLUCOSE SERPL-MCNC: 158 MG/DL (ref 70–99)
POTASSIUM SERPL-SCNC: 5.2 MMOL/L (ref 3.4–5.3)
PROT SERPL-MCNC: 7.8 G/DL (ref 6.8–8.8)
SODIUM SERPL-SCNC: 131 MMOL/L (ref 133–144)

## 2017-12-29 PROCEDURE — 80053 COMPREHEN METABOLIC PANEL: CPT | Performed by: INTERNAL MEDICINE

## 2017-12-29 PROCEDURE — 36415 COLL VENOUS BLD VENIPUNCTURE: CPT

## 2017-12-29 NOTE — PROGRESS NOTES
DATE OF SERVICE:  12/28/20a7      HISTORY OF PRESENT ILLNESS:  Ellen Campos is a 72-year-old patient who comes in today for interim followup.  She is here for followup of bilateral breast cancer.  Her oncologic history is as follows:  She had originally right-sided breast cancer in 2003 and then the most recent breast cancer was on the left side in 2009.  The left-sided breast cancer was a T2N0 infiltrating ductal carcinoma, which was estrogen and progesterone receptor positive.  She has done well from a breast cancer standpoint.  She is now 8 years out from the previous left sided one.  Unfortunately, she has had some cardiac issues.  She recently had an aortic valve replaced and she has also had 2-vessel bypass done.  She has had some failure to thrive over the course of her cardiac surgeries.  She continues on adjuvant Arimidex from a breast cancer standpoint and she is doing well and is planning to continue it for 10 years.  She denies today any cough, shortness of breath, bone pain.  She has got no worrisome symptomatology from a breast cancer standpoint.      REVIEW OF SYSTEMS:  A 14-point comprehensive review of systems is otherwise unremarkable.      MEDICATIONS:  As charted.      ALLERGIES:  As charted.      PHYSICAL EXAMINATION:   GENERAL:  She is a well-appearing lady in no acute distress.   VITAL SIGNS:  Stable.   HEENT:  Oropharynx clear, mucous membranes moist.   NECK:  Has no masses or goiter.   LYMPH:  There is no cervical, supraclavicular or infraclavicular adenopathy.   CHEST:  Clear to auscultation and percussion bilaterally.   HEART:  S1, S2 normal.  No added sounds or murmurs.   ABDOMEN:  Soft and nontender, no hepatosplenomegaly.   EXTREMITIES:  Legs are without tenderness or edema.   BREASTS:  The patient is status post bilateral mastectomies, scars look good.  Right and left axillae are negative.      DATA REVIEW:  Labs are pending at time of dictation.      IMPRESSION:  A 72-year-old  patient with a history of bilateral breast cancer.  She is doing well from a breast cancer standpoint and she is going to continue on the Arimidex until 2019.  Everything from a cardiac standpoint looks stable at this juncture as well.  From a breast cancer standpoint we need see her back again in 6 months.  I have discussed the schedule of followup with her today.         STUART DUQUE MD             D: 2017 15:25   T: 2017 06:43   MT:       Name:     RANDA SHANNON   MRN:      [id number]        Account:      [id number]   :      1945           Visit Date:   2017      Document: [id number]

## 2017-12-29 NOTE — MR AVS SNAPSHOT
"              After Visit Summary   12/29/2017    Ellen Campos    MRN: [id number]           Patient Information     Date Of Birth          1945        Visit Information        Provider Department      12/29/2017 12:30 PM  ONCOLOGY NURSE [facility] [place] [facility]        Today's Diagnoses     Malignant neoplasm of nipple of left breast in female, estrogen receptor positive (H)    -  1       Follow-ups after your visit        Your next 10 appointments already scheduled     Jun 28, 2018  2:00 PM CDT   Return Visit with Jessica Hinkle MD   [facility] ([facility])    [facility]  [address]  [address]  [facility] [id number] [phone]              Who to contact     If you have questions or need follow up information about today's clinic visit or your schedule please contact [facility] directly at [phone].  Normal or non-critical lab and imaging results will be communicated to you by MyChart, letter or phone within 4 business days after the clinic has received the results. If you do not hear from us within 7 days, please contact the clinic through Double the Donationhart or phone. If you have a critical or abnormal lab result, we will notify you by phone as soon as possible.  Submit refill requests through [employer] or call your pharmacy and they will forward the refill request to us. Please allow 3 business days for your refill to be completed.          Additional Information About Your Visit        MyChart Information     [employer] lets you send messages to your doctor, view your test results, renew your prescriptions, schedule appointments and more. To sign up, go to [email or web address] . Click on \"Log in\" on the left side of the screen, which will take you to the Welcome page. Then click on \"Sign up Now\" on the right side of the page.     You will be asked to enter the access code listed below, as well " as some personal information. Please follow the directions to create your username and password.     Your access code is: [email or web address]  Expires: 3/28/2018  3:13 PM     Your access code will  in 90 days. If you need help or a new code, please call your [facility] clinic or [phone].        Care EveryWhere ID     This is your Care EveryWhere ID. This could be used by other organizations to access your [facility] medical records  [id number]         Blood Pressure from Last 3 Encounters:   17 131/62   17 122/49   16 96/57    Weight from Last 3 Encounters:   17 67 kg (147 lb 9.6 oz)   17 72.6 kg (160 lb)   16 70.9 kg (156 lb 3.2 oz)              We Performed the Following     **Comprehensive metabolic panel FUTURE anytime        Primary Care Provider Office Phone # Fax #    Rosa Maria Howard [phone]       [place] [address]        Equal Access to Services     [facility] : Hadii jostin ku hadasho Sounique, waaxda luqadaha, qaybta kaalmada yaquelin, bull akhtar . So [facility] [phone].    ATENCIÓN: Si habla español, tiene a fernandez disposición servicios gratuitos de asistencia lingüística. Vishal al [phone].    We comply with applicable federal civil rights laws and [place] laws. We do not discriminate on the basis of race, color, national origin, age, disability, sex, sexual orientation, or gender identity.            Thank you!     Thank you for choosing [facility]  for your care. Our goal is always to provide you with excellent care. Hearing back from our patients is one way we can continue to improve our services. Please take a few minutes to complete the written survey that you may receive in the mail after your visit with us. Thank you!             Your Updated Medication List - Protect others around you: Learn how to safely use, store and throw away your medicines at  www.disposemymeds.org.          This list is accurate as of: 12/29/17 11:59 PM.  Always use your most recent med list.                   Brand Name Dispense Instructions for use Diagnosis    anastrozole 1 MG tablet    ARIMIDEX    90 tablet    Take 1 tablet (1 mg) by mouth daily    Malignant neoplasm of left breast in female, estrogen receptor positive, unspecified site of breast (H)       ASPIRIN EC PO      Take 81 mg by mouth daily        BUMEX PO      Take 2 mg by mouth daily        citalopram 20 MG tablet    celeXA    90 tablet    Take 1 tablet (20 mg) by mouth daily    HX: breast cancer       COREG PO      Take 3.125 mg by mouth 2 times daily (with meals)        GLUCOSAMINE SULFATE PO      Take 1,500 mg by mouth daily        IRON SUPPLEMENT PO      Take 325 mg by mouth daily        LIPITOR PO      Take 80 mg by mouth        LISINOPRIL PO      Take 5 mg by mouth daily        LOPID PO      Take 600 mg by mouth 2 times daily (before meals)        NITROGLYCERIN SL      Place 0.4 mg under the tongue        NORVASC PO      Take 5 mg by mouth daily        zolpidem 5 MG tablet    AMBIEN    30 tablet    Take 1 tablet (5 mg) by mouth nightly as needed for sleep    Malignant neoplasm of left breast in female, estrogen receptor positive, unspecified site of breast (H)

## 2017-12-29 NOTE — LETTER
12/29/2017         RE: Ellen Campos  [address]  [place] [address]        Dear Colleague,    Thank you for referring your patient, Ellen Campos, to the [facility]CER CARE. Please see a copy of my visit note below.    Medical Assistant Note:  Ellen Campos presents today for Blood Draw.    Patient seen by provider today: No.   present during visit today: Not Applicable.    Concerns: No Concerns.    Procedure:  Labs Drawn: Yes    Post Assessment:  Labs drawn without difficulty: Yes.    Discharge Plan:  Departure Mode: Ambulatory.    Face to Face Time: 10 mins.    Tana Rankin              Again, thank you for allowing me to participate in the care of your patient.        Sincerely,        [facility] Oncology Nurse

## 2018-01-16 DIAGNOSIS — Z17.0 MALIGNANT NEOPLASM OF LEFT BREAST IN FEMALE, ESTROGEN RECEPTOR POSITIVE, UNSPECIFIED SITE OF BREAST (H): ICD-10-CM

## 2018-01-16 DIAGNOSIS — C50.912 MALIGNANT NEOPLASM OF LEFT BREAST IN FEMALE, ESTROGEN RECEPTOR POSITIVE, UNSPECIFIED SITE OF BREAST (H): ICD-10-CM

## 2018-01-16 RX ORDER — ANASTROZOLE 1 MG/1
1 TABLET ORAL DAILY
Qty: 90 TABLET | Refills: 3 | Status: SHIPPED | OUTPATIENT
Start: 2018-01-16 | End: 2019-01-08

## 2018-01-16 NOTE — TELEPHONE ENCOUNTER
Pending Prescriptions:                       Disp   Refills    anastrozole (ARIMIDEX) 1 MG tablet        90 tab*3            Sig: Take 1 tablet (1 mg) by mouth daily          Last Written Prescription Date:  6/28/2017  Last Fill Quantity: 90,   # refills: 3  Last Office Visit: 12/28/2017  Future Office visit:   6/28/2018    Routing refill request to provider for review/approval.  Pt is requesting that Rx be filled at [facility] in [place] (new pharmacy).  Nehal Ghotra RN BSN

## 2018-01-16 NOTE — TELEPHONE ENCOUNTER
Signed Prescriptions:                        Disp   Refills    anastrozole (ARIMIDEX) 1 MG tablet         90 tab*3        Sig: Take 1 tablet (1 mg) by mouth daily  Authorizing Provider: ОЛЕГ BURROWS    Rx has been approved by Олег Burrows NP.  Nehal Ghotra RN BSN

## 2018-01-18 ENCOUNTER — CARE COORDINATION (OUTPATIENT)
Dept: ONCOLOGY | Facility: CLINIC | Age: 73
End: 2018-01-18

## 2018-01-18 NOTE — PROGRESS NOTES
MD ok'd using generic form of Arimidex. VO from Dr. Manan MD to JOE Rob.  Jovanny Dunlap RN, BSN, OCN  [facility] [place]  Patient Care Coordinator

## 2018-03-06 DIAGNOSIS — C50.912 MALIGNANT NEOPLASM OF LEFT BREAST IN FEMALE, ESTROGEN RECEPTOR POSITIVE, UNSPECIFIED SITE OF BREAST (H): ICD-10-CM

## 2018-03-06 DIAGNOSIS — Z17.0 MALIGNANT NEOPLASM OF LEFT BREAST IN FEMALE, ESTROGEN RECEPTOR POSITIVE, UNSPECIFIED SITE OF BREAST (H): ICD-10-CM

## 2018-03-06 RX ORDER — ZOLPIDEM TARTRATE 5 MG/1
5 TABLET ORAL
Qty: 30 TABLET | Refills: 5 | Status: SHIPPED | OUTPATIENT
Start: 2018-03-06 | End: 2018-09-11

## 2018-03-06 NOTE — TELEPHONE ENCOUNTER
Zolpidem Tartrate 5mg Tablet  Last Written Prescription Date:  6/28/17  Last Fill Quantity: 30,   # refills: 5  Last Office Visit: 12/28/17  Future Office visit:   6/28/18    Pending Prescriptions:                       Disp   Refills    zolpidem (AMBIEN) 5 MG tablet             30 tab*5            Sig: Take 1 tablet (5 mg) by mouth nightly as needed           for sleep

## 2018-04-04 DIAGNOSIS — D50.9 IRON DEFICIENCY ANEMIA, UNSPECIFIED IRON DEFICIENCY ANEMIA TYPE: Primary | ICD-10-CM

## 2018-04-04 RX ORDER — FERROUS SULFATE 325(65) MG
325 TABLET ORAL 2 TIMES DAILY
Qty: 60 TABLET | Refills: 3 | Status: SHIPPED | OUTPATIENT
Start: 2018-04-04 | End: 2018-07-30

## 2018-04-04 NOTE — TELEPHONE ENCOUNTER
Signed Prescriptions:                        Disp   Refills    ferrous sulfate (IRON SUPPLEMENT) 325 (65 *60 tab*3        Sig: Take 1 tablet (325 mg) by mouth 2 times daily  Authorizing Provider: STUART HINKLE    Rx has been signed by Dr Hinkle.  Nehal Ghotra RN BSN

## 2018-04-04 NOTE — TELEPHONE ENCOUNTER
Pending Prescriptions:                       Disp   Refills    ferrous sulfate (IRON SUPPLEMENT) 325 (65*60 tab*3            Sig: Take 1 tablet (325 mg) by mouth 2 times daily          Last Written Prescription Date:  7/19/2017  Last Fill Quantity: 60,   # refills: 2  Last Office Visit: 12/28/2017  Future Office visit:    Next 5 appointments (look out 90 days)     Jun 28, 2018  2:00 PM CDT   Return Visit with Jessica Hinkle MD   [facility] ([facility])    [facility]  [address]  [facility] [address]  [facility] [address]   [phone]                   Routing refill request to provider for review/approval.  Nehal Ghotra RN BSN

## 2018-06-28 ENCOUNTER — ONCOLOGY VISIT (OUTPATIENT)
Dept: ONCOLOGY | Facility: CLINIC | Age: 73
End: 2018-06-28
Attending: INTERNAL MEDICINE
Payer: MEDICARE

## 2018-06-28 ENCOUNTER — HOSPITAL ENCOUNTER (OUTPATIENT)
Facility: CLINIC | Age: 73
Setting detail: SPECIMEN
Discharge: HOME | End: 2018-06-28
Attending: INTERNAL MEDICINE | Admitting: INTERNAL MEDICINE
Payer: MEDICARE

## 2018-06-28 VITALS
HEART RATE: 50 BPM | DIASTOLIC BLOOD PRESSURE: 52 MMHG | TEMPERATURE: 98.2 F | RESPIRATION RATE: 16 BRPM | BODY MASS INDEX: 22.72 KG/M2 | WEIGHT: 141.38 LBS | OXYGEN SATURATION: 99 % | HEIGHT: 66 IN | SYSTOLIC BLOOD PRESSURE: 136 MMHG

## 2018-06-28 DIAGNOSIS — Z17.0 BILATERAL MALIGNANT NEOPLASM OF BREAST IN FEMALE, ESTROGEN RECEPTOR POSITIVE, UNSPECIFIED SITE OF BREAST (H): Primary | ICD-10-CM

## 2018-06-28 DIAGNOSIS — C50.911 BILATERAL MALIGNANT NEOPLASM OF BREAST IN FEMALE, ESTROGEN RECEPTOR POSITIVE, UNSPECIFIED SITE OF BREAST (H): Primary | ICD-10-CM

## 2018-06-28 DIAGNOSIS — C50.912 BILATERAL MALIGNANT NEOPLASM OF BREAST IN FEMALE, ESTROGEN RECEPTOR POSITIVE, UNSPECIFIED SITE OF BREAST (H): Primary | ICD-10-CM

## 2018-06-28 LAB
ALBUMIN SERPL-MCNC: 3.2 G/DL (ref 3.4–5)
ALP SERPL-CCNC: 170 U/L (ref 40–150)
ALT SERPL W P-5'-P-CCNC: 16 U/L (ref 0–50)
ANION GAP SERPL CALCULATED.3IONS-SCNC: 5 MMOL/L (ref 3–14)
AST SERPL W P-5'-P-CCNC: 20 U/L (ref 0–45)
BILIRUB SERPL-MCNC: 0.2 MG/DL (ref 0.2–1.3)
BUN SERPL-MCNC: 33 MG/DL (ref 7–30)
CALCIUM SERPL-MCNC: 9.1 MG/DL (ref 8.5–10.1)
CANCER AG27-29 SERPL-ACNC: 20 U/ML (ref 0–39)
CHLORIDE SERPL-SCNC: 107 MMOL/L (ref 94–109)
CO2 SERPL-SCNC: 19 MMOL/L (ref 20–32)
CREAT SERPL-MCNC: 0.93 MG/DL (ref 0.52–1.04)
ERYTHROCYTE [DISTWIDTH] IN BLOOD BY AUTOMATED COUNT: 14 % (ref 10–15)
GFR SERPL CREATININE-BSD FRML MDRD: 59 ML/MIN/1.7M2
GLUCOSE SERPL-MCNC: 82 MG/DL (ref 70–99)
HCT VFR BLD AUTO: 32.1 % (ref 35–47)
HGB BLD-MCNC: 10.1 G/DL (ref 11.7–15.7)
MCH RBC QN AUTO: 28.9 PG (ref 26.5–33)
MCHC RBC AUTO-ENTMCNC: 31.5 G/DL (ref 31.5–36.5)
MCV RBC AUTO: 92 FL (ref 78–100)
PLATELET # BLD AUTO: 180 10E9/L (ref 150–450)
POTASSIUM SERPL-SCNC: 5.9 MMOL/L (ref 3.4–5.3)
PROT SERPL-MCNC: 7.9 G/DL (ref 6.8–8.8)
RBC # BLD AUTO: 3.5 10E12/L (ref 3.8–5.2)
SODIUM SERPL-SCNC: 131 MMOL/L (ref 133–144)
WBC # BLD AUTO: 6.1 10E9/L (ref 4–11)

## 2018-06-28 PROCEDURE — 99214 OFFICE O/P EST MOD 30 MIN: CPT | Performed by: INTERNAL MEDICINE

## 2018-06-28 PROCEDURE — 85027 COMPLETE CBC AUTOMATED: CPT | Performed by: INTERNAL MEDICINE

## 2018-06-28 PROCEDURE — 36415 COLL VENOUS BLD VENIPUNCTURE: CPT

## 2018-06-28 PROCEDURE — 86300 IMMUNOASSAY TUMOR CA 15-3: CPT | Performed by: INTERNAL MEDICINE

## 2018-06-28 PROCEDURE — 80053 COMPREHEN METABOLIC PANEL: CPT | Performed by: INTERNAL MEDICINE

## 2018-06-28 PROCEDURE — G0463 HOSPITAL OUTPT CLINIC VISIT: HCPCS

## 2018-06-28 ASSESSMENT — PAIN SCALES - GENERAL: PAINLEVEL: NO PAIN (0)

## 2018-06-28 NOTE — MR AVS SNAPSHOT
After Visit Summary   6/28/2018    Ellen Campos    MRN: [id number]           Patient Information     Date Of Birth          1945        Visit Information        Provider Department      6/28/2018 2:00 PM Jessica Hinkle MD [facility]      Today's Diagnoses     Bilateral malignant neoplasm of breast in female, estrogen receptor positive, unspecified site of breast (H)    -  1      Care Instructions    RTC MD 6 months        Labs today  -Sarai          Follow-ups after your visit        Your next 10 appointments already scheduled     Dec 20, 2018  3:00 PM CST   Return Visit with Jessica Hinkle MD   [facility] ([facility])    [facility]  [address]  [facility] [address]  [facility] [address] [phone]              Who to contact     If you have questions or need follow up information about today's clinic visit or your schedule please contact [facility] directly at [phone].  Normal or non-critical lab and imaging results will be communicated to you by Must See Indiahart, letter or phone within 4 business days after the clinic has received the results. If you do not hear from us within 7 days, please contact the clinic through [employer] or phone. If you have a critical or abnormal lab result, we will notify you by phone as soon as possible.  Submit refill requests through [employer] or call your pharmacy and they will forward the refill request to us. Please allow 3 business days for your refill to be completed.          Additional Information About Your Visit        Must See Indiahart Information     [employer] gives you secure access to your electronic health record. If you see a primary care provider, you can also send messages to your care team and make appointments. If you have questions, please call your primary care clinic.  If you do not have a primary care provider,  "please call [phone] and they will assist you.        Care EveryWhere ID     This is your Care EveryWhere ID. This could be used by other organizations to access your [place] medical records  [id number]        Your Vitals Were     Pulse Temperature Respirations Height Pulse Oximetry BMI (Body Mass Index)    50 98.2  F (36.8  C) (Tympanic) 16 1.67 m (5' 5.75\") 99% 22.99 kg/m2       Blood Pressure from Last 3 Encounters:   06/28/18 136/52   12/28/17 131/62   06/28/17 122/49    Weight from Last 3 Encounters:   06/28/18 64.1 kg (141 lb 6 oz)   12/28/17 67 kg (147 lb 9.6 oz)   06/28/17 72.6 kg (160 lb)              We Performed the Following     Ca27.29  breast tumor marker     CBC with platelets     Comprehensive metabolic panel (BMP + Alb, Alk Phos, ALT, AST, Total. Bili, TP)        Primary Care Provider Office Phone # Fax #    Rosa Maria Howard [phone]       [facility] [address] [facility] [address] [facility] [address]        Equal Access to Services     [facility] : Hadii aad ku hadasho Sojoanneali, waaxda luqadaha, qaybta kaalmada adejuan pabloyada, bull ivy. So [facility] [phone].    ATENCIÓN: Si habla español, tiene a fernandez disposición servicios gratuitos de asistencia lingüística. [facility] [phone].    We comply with applicable federal civil rights laws and [place] laws. We do not discriminate on the basis of race, color, national origin, age, disability, sex, sexual orientation, or gender identity.            Thank you!     Thank you for choosing [facility]  for your care. Our goal is always to provide you with excellent care. Hearing back from our patients is one way we can continue to improve our services. Please take a few minutes to complete the written survey that you may receive in the mail after your visit with us. Thank you!             Your Updated Medication List - Protect others around you: Learn how to safely use, store and throw " away your medicines at www.disposemymeds.org.          This list is accurate as of 6/28/18  3:14 PM.  Always use your most recent med list.                   Brand Name Dispense Instructions for use Diagnosis    anastrozole 1 MG tablet    ARIMIDEX    90 tablet    Take 1 tablet (1 mg) by mouth daily    Malignant neoplasm of left breast in female, estrogen receptor positive, unspecified site of breast (H)       ASPIRIN EC PO      Take 81 mg by mouth daily        BUMEX PO      Take 2 mg by mouth daily        citalopram 20 MG tablet    celeXA    90 tablet    Take 1 tablet (20 mg) by mouth daily    HX: breast cancer       COREG PO      Take 3.125 mg by mouth 2 times daily (with meals)        ferrous sulfate 325 (65 Fe) MG tablet    IRON SUPPLEMENT    60 tablet    Take 1 tablet (325 mg) by mouth 2 times daily    Iron deficiency anemia, unspecified iron deficiency anemia type       GLUCOSAMINE SULFATE PO      Take 1,500 mg by mouth daily        LIPITOR PO      Take 80 mg by mouth        LISINOPRIL PO      Take 5 mg by mouth daily        LOPID PO      Take 600 mg by mouth 2 times daily (before meals)        NITROGLYCERIN SL      Place 0.4 mg under the tongue        NORVASC PO      Take 5 mg by mouth daily        zolpidem 5 MG tablet    AMBIEN    30 tablet    Take 1 tablet (5 mg) by mouth nightly as needed for sleep    Malignant neoplasm of left breast in female, estrogen receptor positive, unspecified site of breast (H)

## 2018-06-28 NOTE — LETTER
6/28/2018         RE: Ellen Campos  [address]  [place] [address]        Dear Colleague,    Thank you for referring your patient, Ellen Campos, to the [facility]CER CARE. Please see a copy of my visit note below.    Visit Date:   06/28/2018      INDICATIONS:  Ellen is 72 years old, comes in today for interim followup.  She is here for followup of bilateral breast cancer.  She originally had a right-sided breast cancer in 2003 and then a most recent left-sided breast cancer in 2009.  The left-sided breast cancer was a T2 N0 infiltrating ductal carcinoma in the upper outer quadrant, which was ER/CO positive, and she has done well.  She is now on active treatment with adjuvant Arimidex for the left-sided breast cancer, and she is going to continue that until 2019.  She has had some issues with cardiac health.  She had an aortic valve replaced, and she has also had a 2-vessel bypass.  She comes in today for interim followup.  She is actually looking good today.  She has no cardiac symptomatology.  She denies any cough, shortness of breath, bone pain, any worrisome symptomatology from a breast cancer standpoint.  We have talked about the duration of time of her Arimidex, and we are due to stop it in 2019.  She has no major problems at this point.      REVIEW OF SYSTEMS:  A 14-point comprehensive review of systems is otherwise unremarkable.      MEDICATIONS:  As charted.      ALLERGIES:  AS CHARTED.        PHYSICAL EXAMINATION:   GENERAL:  She is a well-appearing lady in no acute distress.   VITAL SIGNS:  Stable.     HEENT:  Oropharynx clear, mucous membranes moist.   NECK:  Has no masses or goiter.     LYMPHS:  There is no cervical, supraclavicular or infraclavicular adenopathy.   CHEST:  Clear to auscultation and percussion bilaterally.   HEART:  Heart sounds 1, 2 normal.  She has no murmur.  She has an aortic valve.     GASTROINTESTINAL:  Abdomen soft and nontender, no  hepatosplenomegaly.   EXTREMITIES:  Legs are without tenderness or edema.   BREASTS:  The patient is status post bilateral mastectomies.  The scars look good.  Right and left axillae are negative.        DATA REVIEWED:  Labs are pending at time of dictation.        IMPRESSION AND PLAN:  A 72-year-old patient with a history of bilateral breast cancer.  She is currently on active treatment with Arimidex.  Her most recent cancer was on the left side, and it was a T2 N0 infiltrating ductal carcinoma of the left breast in the upper outer quadrant, estrogen receptor positive.  She is going to continue on adjuvant Arimidex until 2019.  She is tolerating it well.  Her cardiac status seems stable at this point.  I have drawn routine blood work today, and I am going to see her back in my clinic in 6 months.         STUART HINKLE MD             D: 2018   T: 2018   MT: MINI      Name:     RANDA SHANNON   MRN:      [id number]        Account:      [id number]   :      1945           Visit Date:   2018      Document: [id number]       Again, thank you for allowing me to participate in the care of your patient.        Sincerely,        Stuart Hinkle MD

## 2018-06-28 NOTE — NURSING NOTE
"Oncology Rooming Note    June 28, 2018 2:06 PM   Ellen Campos is a 72 year old female who presents for:    Chief Complaint   Patient presents with     Oncology Clinic Visit     Malignant neoplasm of left breast      Initial Vitals: /52  Pulse 50  Temp 98.2  F (36.8  C) (Tympanic)  Resp 16  Ht 1.67 m (5' 5.75\")  Wt 64.1 kg (141 lb 6 oz)  SpO2 99%  BMI 22.99 kg/m2 Estimated body mass index is 22.99 kg/(m^2) as calculated from the following:    Height as of this encounter: 1.67 m (5' 5.75\").    Weight as of this encounter: 64.1 kg (141 lb 6 oz). Body surface area is 1.72 meters squared.  No Pain (0) Comment: Data Unavailable   No LMP recorded.  Allergies reviewed: Yes  Medications reviewed: Yes    Medications: Medication refills not needed today.  Pharmacy name entered into BAB[employer]:    [id number] - [place], [place] - [address] [id number] - [place], [place] - [address] [facility]    Clinical concerns: follow up     8 minutes for nursing intake (face to face time)     Cara Salinas CMA     DISCHARGE PLAN:  Next appointments: See patient instruction section  Departure Mode: Ambulatory  Accompanied by: self  0 minutes for nursing discharge (face to face time)   Cara Salinas CMA                "

## 2018-07-30 DIAGNOSIS — D50.9 IRON DEFICIENCY ANEMIA, UNSPECIFIED IRON DEFICIENCY ANEMIA TYPE: ICD-10-CM

## 2018-07-30 RX ORDER — FERROUS SULFATE 325(65) MG
325 TABLET ORAL 2 TIMES DAILY
Qty: 60 TABLET | Refills: 3 | Status: SHIPPED | OUTPATIENT
Start: 2018-07-30 | End: 2019-10-03

## 2018-07-30 NOTE — TELEPHONE ENCOUNTER
Signed Prescriptions:                        Disp   Refills    ferrous sulfate (IRON SUPPLEMENT) 325 (65 *60 tab*3        Sig: Take 1 tablet (325 mg) by mouth 2 times daily  Authorizing Provider: ENMANUEL RODRIGUEZ     Rx has been approved by ELOY Kimble.  Nehal Ghotra, RN, BSN, OCN

## 2018-07-30 NOTE — TELEPHONE ENCOUNTER
Pending Prescriptions:                       Disp   Refills    ferrous sulfate (IRON SUPPLEMENT) 325 (65*60 tab*3            Sig: Take 1 tablet (325 mg) by mouth 2 times daily         Last Written Prescription Date:  4/4/2018  Last Fill Quantity: 60,   # refills: 3  Last Office Visit: 6/28/2018  Future Office visit:   12/20/2018    Routing refill request to provider for review/approval.  Nehal Ghotra, RN, BSN, OCN

## 2018-09-10 DIAGNOSIS — Z85.3 HX: BREAST CANCER: ICD-10-CM

## 2018-09-10 RX ORDER — CITALOPRAM HYDROBROMIDE 20 MG/1
20 TABLET ORAL DAILY
Qty: 90 TABLET | Refills: 3 | Status: SHIPPED | OUTPATIENT
Start: 2018-09-10

## 2018-09-10 NOTE — TELEPHONE ENCOUNTER
Pending Prescriptions:                       Disp   Refills    citalopram (CELEXA) 20 MG tablet          90 tab*3            Sig: Take 1 tablet (20 mg) by mouth daily         Last Written Prescription Date:  6/15/2017  Last Fill Quantity: 90,   # refills: 4  Last Office Visit: 6/28/2018  Future Office visit: 12/20/2018      Routing refill request to provider for review/approval.   Nehal Ghotra, RN, BSN, OCN

## 2018-09-10 NOTE — TELEPHONE ENCOUNTER
Signed Prescriptions:                        Disp   Refills    citalopram (CELEXA) 20 MG tablet           90 tab*3        Sig: Take 1 tablet (20 mg) by mouth daily  Authorizing Provider: ENMANUEL RODRIGUEZ     Rx has been approved by ELOY Kimble.  Nehal Ghotra, RN, BSN, OCN

## 2018-09-11 DIAGNOSIS — C50.912 MALIGNANT NEOPLASM OF LEFT BREAST IN FEMALE, ESTROGEN RECEPTOR POSITIVE, UNSPECIFIED SITE OF BREAST (H): ICD-10-CM

## 2018-09-11 DIAGNOSIS — Z17.0 MALIGNANT NEOPLASM OF LEFT BREAST IN FEMALE, ESTROGEN RECEPTOR POSITIVE, UNSPECIFIED SITE OF BREAST (H): ICD-10-CM

## 2018-09-11 RX ORDER — ZOLPIDEM TARTRATE 5 MG/1
5 TABLET ORAL
Qty: 30 TABLET | Refills: 5 | Status: SHIPPED | OUTPATIENT
Start: 2018-09-11 | End: 2019-03-08

## 2018-09-11 NOTE — TELEPHONE ENCOUNTER
Ambien 5 mg      Last Written Prescription Date:  3/6/18  Last Fill Quantity: 30,   # refills: 5  Last Office Visit: 6/28/18  Future Office visit: 12/20/18

## 2018-09-11 NOTE — TELEPHONE ENCOUNTER
Rx for ambien has been approved by ELOY Kimble. Rx for ambien was called in to pharmacy.  Pt has been notified. Patient verbalized understanding and agreement with plan.   Nehal Ghotra, RN, BSN, OCN

## 2018-10-09 ENCOUNTER — TELEPHONE (OUTPATIENT)
Dept: ONCOLOGY | Facility: CLINIC | Age: 73
End: 2018-10-09

## 2018-10-09 DIAGNOSIS — Z17.0 MALIGNANT NEOPLASM OF NIPPLE OF LEFT BREAST IN FEMALE, ESTROGEN RECEPTOR POSITIVE (H): Primary | ICD-10-CM

## 2018-10-09 DIAGNOSIS — C50.012 MALIGNANT NEOPLASM OF NIPPLE OF LEFT BREAST IN FEMALE, ESTROGEN RECEPTOR POSITIVE (H): Primary | ICD-10-CM

## 2018-10-09 NOTE — TELEPHONE ENCOUNTER
Pt is calling.  Has had bilateral mastectomies. Is requesting orders for post-mastectomy bras and prosthesis. Is requesting at least 3 bras with refills. Would like order to be faxed to [facility] at [phone]. No need to call pt back after order is faxed.  Nehal Ghotra RN, BSN, OCN

## 2018-10-10 NOTE — TELEPHONE ENCOUNTER
Orders have been printed and signed by Dr Hinkle.  Faxed to [facility].  No further action needed.  Nehal Ghotra RN, BSN, OCN

## 2018-12-20 ENCOUNTER — HOSPITAL ENCOUNTER (OUTPATIENT)
Facility: CLINIC | Age: 73
Setting detail: SPECIMEN
Discharge: HOME | End: 2018-12-20
Attending: INTERNAL MEDICINE | Admitting: INTERNAL MEDICINE
Payer: MEDICARE

## 2018-12-20 ENCOUNTER — ONCOLOGY VISIT (OUTPATIENT)
Dept: ONCOLOGY | Facility: CLINIC | Age: 73
End: 2018-12-20
Attending: INTERNAL MEDICINE
Payer: MEDICARE

## 2018-12-20 VITALS
SYSTOLIC BLOOD PRESSURE: 135 MMHG | RESPIRATION RATE: 18 BRPM | HEART RATE: 53 BPM | BODY MASS INDEX: 22.28 KG/M2 | DIASTOLIC BLOOD PRESSURE: 59 MMHG | TEMPERATURE: 97.8 F | OXYGEN SATURATION: 98 % | HEIGHT: 66 IN | WEIGHT: 138.6 LBS

## 2018-12-20 DIAGNOSIS — C50.912 MALIGNANT NEOPLASM OF LEFT BREAST IN FEMALE, ESTROGEN RECEPTOR POSITIVE, UNSPECIFIED SITE OF BREAST (H): Primary | ICD-10-CM

## 2018-12-20 DIAGNOSIS — Z17.0 MALIGNANT NEOPLASM OF LEFT BREAST IN FEMALE, ESTROGEN RECEPTOR POSITIVE, UNSPECIFIED SITE OF BREAST (H): Primary | ICD-10-CM

## 2018-12-20 LAB
ALBUMIN SERPL-MCNC: 2.7 G/DL (ref 3.4–5)
ALP SERPL-CCNC: 190 U/L (ref 40–150)
ALT SERPL W P-5'-P-CCNC: 19 U/L (ref 0–50)
ANION GAP SERPL CALCULATED.3IONS-SCNC: 5 MMOL/L (ref 3–14)
AST SERPL W P-5'-P-CCNC: 30 U/L (ref 0–45)
BILIRUB SERPL-MCNC: 0.2 MG/DL (ref 0.2–1.3)
BUN SERPL-MCNC: 37 MG/DL (ref 7–30)
CALCIUM SERPL-MCNC: 9.4 MG/DL (ref 8.5–10.1)
CHLORIDE SERPL-SCNC: 103 MMOL/L (ref 94–109)
CO2 SERPL-SCNC: 25 MMOL/L (ref 20–32)
CREAT SERPL-MCNC: 0.96 MG/DL (ref 0.52–1.04)
ERYTHROCYTE [DISTWIDTH] IN BLOOD BY AUTOMATED COUNT: 15.2 % (ref 10–15)
GFR SERPL CREATININE-BSD FRML MDRD: 59 ML/MIN/{1.73_M2}
GLUCOSE SERPL-MCNC: 111 MG/DL (ref 70–99)
HCT VFR BLD AUTO: 36 % (ref 35–47)
HGB BLD-MCNC: 11.4 G/DL (ref 11.7–15.7)
MCH RBC QN AUTO: 28.2 PG (ref 26.5–33)
MCHC RBC AUTO-ENTMCNC: 31.7 G/DL (ref 31.5–36.5)
MCV RBC AUTO: 89 FL (ref 78–100)
PLATELET # BLD AUTO: 212 10E9/L (ref 150–450)
POTASSIUM SERPL-SCNC: 4.8 MMOL/L (ref 3.4–5.3)
PROT SERPL-MCNC: 7.7 G/DL (ref 6.8–8.8)
RBC # BLD AUTO: 4.04 10E12/L (ref 3.8–5.2)
SODIUM SERPL-SCNC: 133 MMOL/L (ref 133–144)
WBC # BLD AUTO: 6.2 10E9/L (ref 4–11)

## 2018-12-20 PROCEDURE — G0463 HOSPITAL OUTPT CLINIC VISIT: HCPCS

## 2018-12-20 PROCEDURE — 36415 COLL VENOUS BLD VENIPUNCTURE: CPT

## 2018-12-20 PROCEDURE — 99214 OFFICE O/P EST MOD 30 MIN: CPT | Performed by: INTERNAL MEDICINE

## 2018-12-20 PROCEDURE — 85027 COMPLETE CBC AUTOMATED: CPT | Performed by: INTERNAL MEDICINE

## 2018-12-20 PROCEDURE — 86300 IMMUNOASSAY TUMOR CA 15-3: CPT | Performed by: INTERNAL MEDICINE

## 2018-12-20 PROCEDURE — 80053 COMPREHEN METABOLIC PANEL: CPT | Performed by: INTERNAL MEDICINE

## 2018-12-20 ASSESSMENT — PAIN SCALES - GENERAL: PAINLEVEL: NO PAIN (0)

## 2018-12-20 ASSESSMENT — MIFFLIN-ST. JEOR: SCORE: 1151.47

## 2018-12-20 NOTE — NURSING NOTE
"Oncology Rooming Note    December 20, 2018 3:10 PM   Ellen Campos is a 73 year old female who presents for:    Chief Complaint   Patient presents with     Oncology Clinic Visit     Malignant neoplasm of left breast     Initial Vitals: /59   Pulse 53   Temp 97.8  F (36.6  C) (Tympanic)   Resp 18   Ht 1.67 m (5' 5.75\")   Wt 62.9 kg (138 lb 9.6 oz)   SpO2 98%   BMI 22.54 kg/m   Estimated body mass index is 22.54 kg/m  as calculated from the following:    Height as of this encounter: 1.67 m (5' 5.75\").    Weight as of this encounter: 62.9 kg (138 lb 9.6 oz). Body surface area is 1.71 meters squared.  No Pain (0) Comment: Data Unavailable   No LMP recorded.  Allergies reviewed: Yes  Medications reviewed: Yes    Medications: Medication refills not needed today.  Pharmacy name entered into [employer]:    [id number] - [place], [place] - [address]/[id number] - [place], [place] - [address] [facility]    Clinical concerns: f/u     8 minutes for nursing intake (face to face time)     Luli Ramirez CMA            DISCHARGE PLAN:  Next appointments: See patient instruction section  Departure Mode: Ambulatory  Accompanied by: selef  0 minutes for nursing discharge (face to face time)   Luli Ramirez CMA      "

## 2018-12-20 NOTE — LETTER
2018         RE: Ellen Campso  [address]  [place] [address]        Dear Colleague,    Thank you for referring your patient, Ellen Campos, to the [facility]. Please see a copy of my visit note below.    Visit Date:   2018      Nicole Campos is 73 years old, comes in today for interim followup.  She is here for followup of bilateral breast cancer.  Recently she had a right-sided breast cancer in  and then more recent a left-sided breast cancer .  Her most recent one was a T2 N0 infiltrating ductal carcinoma in the upper outer quadrant of the left breast, ER/VT-positive.  We did talk about genetic testing in the past and we have talked more about it in detail today, as Nicole has had recently a sister who has been diagnosed with triple-negative breast cancer and then she had another sister who  in [place] from the neglected breast cancer.  So with that family history and with her own personal history of bilateral breast cancer, I have encouraged her to do genetic testing, so set that up for her today.  Apart from that, Nicole does suffer from some issues with her cardiac health.  She has had an aortic valve replacement and she has had a 2-vessel bypass, but she appears to be stable today and doing quite well.  She is due to stop her Arimidex in about 6 months.      REVIEW OF SYSTEMS:  A 14-point comprehensive review of systems apart from what is outlined above is otherwise unremarkable.      MEDICATIONS:  As charted.      ALLERGIES:  As charted.        PHYSICAL EXAMINATION:       GENERAL:  She is a well-appearing lady in no acute distress.   VITAL SIGNS:  Stable.  Oropharynx clear.  Mucous membranes moist.   NECK:  No masses or goiter.  There is no cervical, supraclavicular or infraclavicular adenopathy.   CHEST:  Clear to auscultation and percussion bilaterally.   HEART:  Sounds 1, 2 normal.  No added sounds or murmurs.   ABDOMEN:   Heart sounds 1 and 2 normal.  She has got a mild systolic murmur along the left sternal border.  You can hear her aortic valve.   ABDOMEN:  Soft and nontender.  No hepatosplenomegaly.     EXTREMITIES:  Legs without tenderness or edema.    CHEST:  The patient is status post bilateral mastectomies.  A scars look good.  Right and left axilla are negative.      LABORATORY DATA:  White cell count 6.2, hemoglobin 11.4, platelets 212.  The rest of her labs are pending at time of dictation.      ASSESSMENT AND PLAN:  A 73-year-old patient with history of bilateral breast cancer.  I have also discussed with her family history of breast cancer today which is quite significant with 2 sisters with breast cancer.  We have discussed genetic testing.  I would like to refer her to our genetic counselor today and she is in agreement.  She will continue the Arimidex for another 6 months and then we will stop it, as she will have been on it for 10 years.  I will see her back in my clinic in 6 months.      Consultation time today has been 30 minutes after the majority of time was spent o counseling and direction of patient care.         STUART HINKLE MD             D: 2018   T: 2018   MT: MINI      Name:     RANDA SHANNON   MRN:      [id number]        Account:      [id number]   :      1945           Visit Date:   2018      Document: [id number]       Again, thank you for allowing me to participate in the care of your patient.        Sincerely,        Stuart Hinkle MD

## 2018-12-21 LAB — CANCER AG27-29 SERPL-ACNC: 27 U/ML (ref 0–39)

## 2018-12-26 NOTE — PROGRESS NOTES
Visit Date:   2018      Nicole Campos is 73 years old, comes in today for interim followup.  She is here for followup of bilateral breast cancer.  Recently she had a right-sided breast cancer in  and then more recent a left-sided breast cancer .  Her most recent one was a T2 N0 infiltrating ductal carcinoma in the upper outer quadrant of the left breast, ER/[place]-positive.  We did talk about genetic testing in the past and we have talked more about it in detail today, as Nicole has had recently a sister who has been diagnosed with triple-negative breast cancer and then she had another sister who  in [place] from the neglected breast cancer.  So with that family history and with her own personal history of bilateral breast cancer, I have encouraged her to do genetic testing, so set that up for her today.  Apart from that, Nicole does suffer from some issues with her cardiac health.  She has had an aortic valve replacement and she has had a 2-vessel bypass, but she appears to be stable today and doing quite well.  She is due to stop her Arimidex in about 6 months.      REVIEW OF SYSTEMS:  A 14-point comprehensive review of systems apart from what is outlined above is otherwise unremarkable.      MEDICATIONS:  As charted.      ALLERGIES:  As charted.        PHYSICAL EXAMINATION:       GENERAL:  She is a well-appearing lady in no acute distress.   VITAL SIGNS:  Stable.  Oropharynx clear.  Mucous membranes moist.   NECK:  No masses or goiter.  There is no cervical, supraclavicular or infraclavicular adenopathy.   CHEST:  Clear to auscultation and percussion bilaterally.   HEART:  Sounds 1, 2 normal.  No added sounds or murmurs.   ABDOMEN:  Heart sounds 1 and 2 normal.  She has got a mild systolic murmur along the left sternal border.  You can hear her aortic valve.   ABDOMEN:  Soft and nontender.  No hepatosplenomegaly.     EXTREMITIES:  Legs without tenderness or edema.    CHEST:  The patient is  status post bilateral mastectomies.  A scars look good.  Right and left axilla are negative.      LABORATORY DATA:  White cell count 6.2, hemoglobin 11.4, platelets 212.  The rest of her labs are pending at time of dictation.      ASSESSMENT AND PLAN:  A 73-year-old patient with history of bilateral breast cancer.  I have also discussed with her family history of breast cancer today which is quite significant with 2 sisters with breast cancer.  We have discussed genetic testing.  I would like to refer her to our genetic counselor today and she is in agreement.  She will continue the Arimidex for another 6 months and then we will stop it, as she will have been on it for 10 years.  I will see her back in my clinic in 6 months.      Consultation time today has been 30 minutes after the majority of time was spent o counseling and direction of patient care.         STUART DUQUE MD             D: 2018   T: 2018   MT: MINI      Name:     RANDA SHANNON   MRN:      [id number]        Account:      [id number]   :      1945           Visit Date:   2018      Document: [id number]

## 2019-01-08 DIAGNOSIS — C50.912 MALIGNANT NEOPLASM OF LEFT BREAST IN FEMALE, ESTROGEN RECEPTOR POSITIVE, UNSPECIFIED SITE OF BREAST (H): ICD-10-CM

## 2019-01-08 DIAGNOSIS — Z17.0 MALIGNANT NEOPLASM OF LEFT BREAST IN FEMALE, ESTROGEN RECEPTOR POSITIVE, UNSPECIFIED SITE OF BREAST (H): ICD-10-CM

## 2019-01-08 RX ORDER — ANASTROZOLE 1 MG/1
1 TABLET ORAL DAILY
Qty: 90 TABLET | Refills: 3 | Status: SHIPPED | OUTPATIENT
Start: 2019-01-08 | End: 2019-01-29

## 2019-01-29 DIAGNOSIS — C50.912 MALIGNANT NEOPLASM OF LEFT BREAST IN FEMALE, ESTROGEN RECEPTOR POSITIVE, UNSPECIFIED SITE OF BREAST (H): ICD-10-CM

## 2019-01-29 DIAGNOSIS — Z17.0 MALIGNANT NEOPLASM OF LEFT BREAST IN FEMALE, ESTROGEN RECEPTOR POSITIVE, UNSPECIFIED SITE OF BREAST (H): ICD-10-CM

## 2019-01-29 RX ORDER — ANASTROZOLE 1 MG/1
1 TABLET ORAL DAILY
Qty: 90 TABLET | Refills: 3 | Status: SHIPPED | OUTPATIENT
Start: 2019-01-29

## 2019-03-08 DIAGNOSIS — C50.912 MALIGNANT NEOPLASM OF LEFT BREAST IN FEMALE, ESTROGEN RECEPTOR POSITIVE, UNSPECIFIED SITE OF BREAST (H): ICD-10-CM

## 2019-03-08 DIAGNOSIS — Z17.0 MALIGNANT NEOPLASM OF LEFT BREAST IN FEMALE, ESTROGEN RECEPTOR POSITIVE, UNSPECIFIED SITE OF BREAST (H): ICD-10-CM

## 2019-03-11 NOTE — TELEPHONE ENCOUNTER
Pt returned call to the clinic.  States that she does need a refill on the ambien, but states that it is fine to wait until 3/13 when Dr Hinkle is back in the clinic.  Pt states that she does take ambien most nights to help her sleep. Pt states that there is no need to call her back if the refill is sent in; only call pt if refill cannot be sent in. Patient verbalized understanding and agreement with plan.  Nehal Ghotra, RN, BSN, OCN

## 2019-03-11 NOTE — TELEPHONE ENCOUNTER
Pending Prescriptions:                       Disp   Refills    zolpidem (AMBIEN) 5 MG tablet [Pharmacy M*30 tab*0            Sig: TAKE 1 TABLET BY MOUTH EVERY DAY AS NEEDED FOR           SLEEP         Last Written Prescription Date:  9/11/2018  Last Fill Quantity: 30,   # refills: 5  Last Office Visit: 12/20/2018  Future Office visit:  6/20/2019     Routing refill request to provider for review/approval.  Nehal Ghotra, RN, BSN, OCN

## 2019-03-11 NOTE — TELEPHONE ENCOUNTER
Per ELOY Kimble, she does not feel comfortable refilling this medication for pt.  Anjali has never seen this pt.  Called pharmacy, and they state that pt has been filling the Rx for ambien on a monthly basis. Left a message for pt to call back to discuss.  Is it OK to wait until 3/13 when Dr Hinkle is back in the clinic?  Nehal Ghotra, RN, BSN, OCN

## 2019-03-13 RX ORDER — ZOLPIDEM TARTRATE 5 MG/1
TABLET ORAL
Qty: 30 TABLET | Refills: 3 | Status: SHIPPED | OUTPATIENT
Start: 2019-03-13 | End: 2019-07-08

## 2019-03-13 NOTE — TELEPHONE ENCOUNTER
Signed Prescriptions:                        Disp   Refills    zolpidem (AMBIEN) 5 MG tablet              30 tab*3        Sig: TAKE 1 TABLET BY MOUTH EVERY DAY AS NEEDED FOR SLEEP  Authorizing Provider: STUART HINKLE     Rx has been approved and signed by Dr Hinkle.  Rx was faxed to [facility] pharmacy in [place] on [address] [facility] [address] at [phone]. No further action needed.  Nehal Ghotra, RN, BSN, OCN

## 2019-03-20 ENCOUNTER — HOSPITAL ENCOUNTER (OUTPATIENT)
Dept: LAB | Facility: CLINIC | Age: 74
End: 2019-03-20
Attending: GENETIC COUNSELOR, MS
Payer: MEDICARE

## 2019-03-20 ENCOUNTER — HOSPITAL ENCOUNTER (OUTPATIENT)
Facility: CLINIC | Age: 74
Setting detail: SPECIMEN
End: 2019-03-20
Attending: GENETIC COUNSELOR, MS
Payer: MEDICARE

## 2019-03-20 ENCOUNTER — ONCOLOGY VISIT (OUTPATIENT)
Dept: ONCOLOGY | Facility: CLINIC | Age: 74
End: 2019-03-20
Attending: GENETIC COUNSELOR, MS
Payer: MEDICARE

## 2019-03-20 DIAGNOSIS — C50.911 BILATERAL MALIGNANT NEOPLASM OF BREAST IN FEMALE, UNSPECIFIED ESTROGEN RECEPTOR STATUS, UNSPECIFIED SITE OF BREAST (H): Primary | ICD-10-CM

## 2019-03-20 DIAGNOSIS — Z17.0 MALIGNANT NEOPLASM OF LEFT BREAST IN FEMALE, ESTROGEN RECEPTOR POSITIVE, UNSPECIFIED SITE OF BREAST (H): ICD-10-CM

## 2019-03-20 DIAGNOSIS — Z80.41 FAMILY HISTORY OF MALIGNANT NEOPLASM OF OVARY: ICD-10-CM

## 2019-03-20 DIAGNOSIS — C50.912 MALIGNANT NEOPLASM OF LEFT BREAST IN FEMALE, ESTROGEN RECEPTOR POSITIVE, UNSPECIFIED SITE OF BREAST (H): ICD-10-CM

## 2019-03-20 DIAGNOSIS — C50.912 BILATERAL MALIGNANT NEOPLASM OF BREAST IN FEMALE, UNSPECIFIED ESTROGEN RECEPTOR STATUS, UNSPECIFIED SITE OF BREAST (H): Primary | ICD-10-CM

## 2019-03-20 DIAGNOSIS — Z80.3 FAMILY HISTORY OF MALIGNANT NEOPLASM OF BREAST: ICD-10-CM

## 2019-03-20 PROCEDURE — 96040 ZZH GENETIC COUNSELING, EACH 30 MINUTES: CPT | Performed by: GENETIC COUNSELOR, MS

## 2019-03-20 PROCEDURE — 36415 COLL VENOUS BLD VENIPUNCTURE: CPT

## 2019-03-20 NOTE — LETTER
Cancer Risk Management  Program Locations    [facility]  [facility]  Mailing Address  Cancer Risk Management Program  [facility]  [address]  [place] [address]    New patient appointments  [phone]  March 25, 2019    Ellenkamila Campos  [address]  [place] [address]      Dear Ellen,    It was a pleasure meeting with you at [facility] in [place] on 3-20-19.  Here is a copy of the progress note from your recent genetic counseling visit to the Cancer Risk Management Program.  If you have any additional questions, please feel free to call.    Cancer Risk Management Program Genetic Counseling Note    3/20/2019    Referring Provider: Dr. Jessica Hinkle    Presenting Information:   I met with Ellen Campos today for genetic counseling at the Cancer Risk Management Program at the [facility] in [place] to discuss her personal and family history of breast cancer.  She is here today to review this history, cancer screening recommendations, and available genetic testing options.    Personal History:  Ellen is a 73 year old female. Per the notes in her chart, Ellen was first diagnosed with breast cancer in her right breast in 2003 at age 57.  (I do not have detailed records of this first cancer to review today.)  Ellen was then later diagnosed in 2009 (at the age of 63) with an infiltrating ductal carcinoma of the left breast that was both ER and IL positive.  She has had bilateral mastectomies associated with this history of two separate breast cancers. She is currently taking Arimidex and will finish up her 10 year course of that this summer.     In reviewing other medical history, Ellen's records showed that she had a myelolipoma of the adrenal  "gland removed in 2017 (at age 71) through the [facility].  Ellen also reports that she has a history of having had her aortic valve replaced, but she was not sure of the exact underlying issue of her aortic valve; she also reports a history of a heart bypass procedure.    Ellen reports that she has her ovaries, fallopian tubes, and uterus in place.  She reports a personal history of routine colonoscopies; she states that her last one was about 4 years ago and did not note any polyps.  She reports no other regular cancer screening/surveillance (besides her regular \"check-ups\" with Dr. Hinkle).    With respect to environmental exposures, Ellen does report a past history of smoking for around 25 years.  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  She was not aware of any other specific environmental exposures that she was concerned about with respect to increased cancer risks.      Family History: (Please see scanned pedigree for detailed family history information)    As noted above, Ellen has a history of bilateral breast cancer (first diagnosis at 57 and second diagnosis at 63).    Ellen reports that she has two sisters with a history a breast cancer: She reports that one sister was diagnosed with breast cancer at age 68 and  from complications of that disease shortly thereafter.  She reports that another sister was recently diagnosed with triple negative breast cancer at age 61.    Ellen reports that she thinks another sister may have a history of breast atypia on previous breast evaluations.    Ellen reports that she had a maternal aunt that was diagnosed with breast cancer in her 80s; she report that this aunt had a daughter who was diagnosed with breast cancer in her 40s.    Ellen reports that another maternal aunt "  of liver cancer in her 50s; she reports that this aunt had a daughter that was diagnosed with breast cancer in her 50s.    Ellen reports that her maternal grandmother  of cancer in her 50s, and it was thought that this may have been an ovarian cancer.  She reports that her maternal grandmother had a sister who had a history of breast cancer.    Ellen reports that one of her brothers has a son that was diagnosed with testicular cancer during his high school years.    Ellen reports that she had a paternal aunt that  of leukemia in her 60s.    Ellen reports that a paternal first cousin also had a history of testicular cancer.    In other family history, Ellen reports that she had a son that  at 4 days of age from complications of a hypoplastic left heart.  We briefly talked about that some families have a genetic predisposition to left-sided heart issues (including bicuspid aortic valve); Ellen states that she does not think her aortic issue was a bicuspid aortic valve.  Ellen also states that her daughter has a history of multiple pregnancy losses (including one with fetal anomalies), but she wasn't sure about if the underlying details of that history.  She also reports that one of her sisters has a son with Down syndrome.    Ellen reports that she is of  ancestry.  She also reported that it is thought that she may have a small amount of Alevism ancestry per ancestry DNA testing.  There is no reported consanguinity.    Discussion:    We reviewed the features of sporadic, familial, and hereditary cancers.  In looking at Ellen's family history, it is possible that a cancer susceptibility gene is present due to her personal history of bilateral breast cancer, her multiple relatives/generations with breast cancer (including a cousin with early onset breast cancer), and a possible ovarian cancer in her maternal grandmother (which can be related to breast cancers in some  families).    We discussed the natural history and genetics of breast cancer. A detailed handout regarding the information we discussed was provided to Ellen at the end of our appointment today and can be found in the after visit summary. Topics included: inheritance pattern, cancer risks, cancer screening recommendations, and also risks, benefits and limitations of testing.    Based on her personal and family history, Ellen meets current National Comprehensive Cancer Network (NCCN) criteria for genetic testing of BRCA1/BRCA2.      We discussed that there are additional genes besides BRCA1/BRCA2 that could cause increased risk for breast and other cancers. As many of these genes present with overlapping features in a family and accurate cancer risk cannot always be established based upon the pedigree analysis alone, it would be reasonable for Ellen to consider panel genetic testing to analyze multiple genes at once.    Ellen was interested in pursuing genetic testing through a panel of genes known to be associated with hereditary cancer syndromes, and so we reviewed the options for this.  After this discussion, Ellen decided that she was interested in an [employer]vaNext genetic testing panel.    The [employer]vaNext genetic testing panel includes analysis of 25 genes associated with hereditary gynecologic, breast, and related cancers: LUCY, BARD1, BRCA1, BRCA2, BRIP1, CDH1, CHEK2, DICER1, EPCAM, MLH1, MRE11A, MSH2, MSH6, MUTYH, NBN, NF1, PALB2, PMS2, PTEN, RAD50, RAD51C, RAD51D, SMARCA4, STK11, and TP53.    We discussed that some of the genes in the [employer]vaNext panel are associated with specific hereditary cancer syndromes and published management guidelines: Hereditary Breast and Ovarian Cancer syndrome (BRCA1, BRCA2), Odom syndrome (MLH1, MSH2, MSH6, PMS2, EPCAM), Hereditary Diffuse Gastric Cancer (CDH1), Cowden syndrome (PTEN), Li Fraumeni syndrome (TP53), Peutz-Jeghers syndrome (STK11), MUTYH Associated Polyposis  (MUTYH), and Neurofibromatosis type 1 (NF1).  Risk-reducing salpingo-oophorectomy can be considered in women with mutations in BRIP1, RAD51C, or RAD51D. Breast and/or other cancer risk management guidelines are available for LUCY, CHEK2, PALB2, NF1, and NBN. The remaining genes (BARD1, DICER1, MRE11A, RAD50, and SMARCA4) are associated with increased cancer risk and may allow us to make medical recommendations when mutations are identified.      Ellen was provided with a detailed brochure from [employer] explaining the OvaNext testing.    Medical Management: For Ellen, we reviewed that the information from genetic testing may determine:    additional cancer screening for which Ellen may qualify (i.e. more frequent colonoscopies, more frequent dermatologic exams, etc.),    options for risk reducing surgeries Ellen could consider (i.e. surgery to remove her ovaries and/or uterus, etc.),      and targeted chemotherapies or indicated surgeries for Ellen if she were to develop certain cancers in the future (i.e. immunotherapy for individuals with Odom syndrome, PARP inhibitors, etc.).     These recommendations and possible targeted chemotherapies will be discussed in detail once genetic testing is completed.     Plan:  1) Today, Ellen elected to proceed with an OvaNext genetic testing panel.  Therefore, consent was reviewed and signed for this genetic testing.  Her blood was then drawn for this analysis.  2) These genetic testing results should be available within 4-6 weeks.  3) Ellen plans to either return to clinic or make a telephone results visit to discuss the results.    Luci Lowe MS, [facility]  Genetic Counselor  Ph: [phone]    Face to face time: 55 minutes

## 2019-03-20 NOTE — PROGRESS NOTES
"Cancer Risk Management Program Genetic Counseling Note    3/20/2019    Referring Provider: Dr. Jessica Hinkle    Presenting Information:   I met with Ellen Campos today for genetic counseling at the Cancer Risk Management Program at the [facility] in [place] to discuss her personal and family history of breast cancer.  She is here today to review this history, cancer screening recommendations, and available genetic testing options.    Personal History:  Ellen is a 73 year old female. Per the notes in her chart, Ellen was first diagnosed with breast cancer in her right breast in  at age 57.  (I do not have detailed records of this first cancer to review today.)  Ellen was then later diagnosed in  (at the age of 63) with an infiltrating ductal carcinoma of the left breast that was both ER and OR positive.  She has had bilateral mastectomies associated with this history of two separate breast cancers. She is currently taking Arimidex and will finish up her 10 year course of that this summer.     In reviewing other medical history, Ellen's records showed that she had a myelolipoma of the adrenal gland removed in 2017 (at age 71) through the [facility].  Ellen also reports that she has a history of having had her aortic valve replaced, but she was not sure of the exact underlying issue of her aortic valve; she also reports a history of a heart bypass procedure.    Ellen reports that she has her ovaries, fallopian tubes, and uterus in place.  She reports a personal history of routine colonoscopies; she states that her last one was about 4 years ago and did not note any polyps.  She reports no other regular cancer screening/surveillance (besides her regular \"check-ups\" with Dr. Hinkle).    With respect to environmental exposures, Ellen does report a past history of smoking for around 25 years.  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  She was not aware of any other specific environmental exposures that she was concerned about with respect to increased cancer risks.      Family History: (Please see scanned pedigree for detailed family history information)    As noted above, Ellen has a history of bilateral breast cancer (first diagnosis at 57 and second diagnosis at 63).    Ellen reports that she has two sisters with a history a breast cancer: She reports that one sister was diagnosed with breast cancer at age 68 and  from complications of that disease shortly thereafter.  She reports that another sister was recently diagnosed with triple negative breast cancer at age 61.    Ellen reports that she thinks another sister may have a history of breast atypia on previous breast evaluations.    Ellen reports that she had a maternal aunt that was diagnosed with breast cancer in her 80s; she report that this aunt had a daughter who was diagnosed with breast cancer in her 40s.    Ellen reports that another maternal aunt  of liver cancer in her 50s; she reports that this aunt had a daughter that was diagnosed with breast cancer in her 50s.    Ellen reports that her maternal grandmother  of cancer in her 50s, and it was thought that this may have been an ovarian cancer.  She reports that her maternal grandmother had a sister who had a history of breast cancer.    Ellen reports that one of her brothers has a son that was diagnosed with testicular cancer during his high school years.    Ellen reports that she had a paternal aunt that  of leukemia in her 60s.    Ellen reports that a paternal first cousin also had a history of testicular cancer.    In other family history, Ellen reports that she had a son that  at 4 days of age from complications of a hypoplastic left  heart.  We briefly talked about that some families have a genetic predisposition to left-sided heart issues (including bicuspid aortic valve); Ellen states that she does not think her aortic issue was a bicuspid aortic valve.  Ellen also states that her daughter has a history of multiple pregnancy losses (including one with fetal anomalies), but she wasn't sure about if the underlying details of that history.  She also reports that one of her sisters has a son with Down syndrome.    Ellen reports that she is of  ancestry.  She also reported that it is thought that she may have a small amount of Rastafarian ancestry per ancestry DNA testing.  There is no reported consanguinity.    Discussion:    We reviewed the features of sporadic, familial, and hereditary cancers.  In looking at Ellen's family history, it is possible that a cancer susceptibility gene is present due to her personal history of bilateral breast cancer, her multiple relatives/generations with breast cancer (including a cousin with early onset breast cancer), and a possible ovarian cancer in her maternal grandmother (which can be related to breast cancers in some families).    We discussed the natural history and genetics of breast cancer. A detailed handout regarding the information we discussed was provided to Ellen at the end of our appointment today and can be found in the after visit summary. Topics included: inheritance pattern, cancer risks, cancer screening recommendations, and also risks, benefits and limitations of testing.    Based on her personal and family history, Ellen meets current National Comprehensive Cancer Network (NCCN) criteria for genetic testing of BRCA1/BRCA2.      We discussed that there are additional genes besides BRCA1/BRCA2 that could cause increased risk for breast and other cancers. As many of these genes present with overlapping features in a family and accurate cancer risk cannot always be  established based upon the pedigree analysis alone, it would be reasonable for Ellen to consider panel genetic testing to analyze multiple genes at once.    Ellen was interested in pursuing genetic testing through a panel of genes known to be associated with hereditary cancer syndromes, and so we reviewed the options for this.  After this discussion, Ellen decided that she was interested in an OvaNext genetic testing panel.    The OvaNext genetic testing panel includes analysis of 25 genes associated with hereditary gynecologic, breast, and related cancers: LUCY, BARD1, BRCA1, BRCA2, BRIP1, CDH1, CHEK2, DICER1, EPCAM, MLH1, MRE11A, MSH2, MSH6, MUTYH, NBN, NF1, PALB2, PMS2, PTEN, RAD50, RAD51C, RAD51D, SMARCA4, STK11, and TP53.    We discussed that some of the genes in the OvaNext panel are associated with specific hereditary cancer syndromes and published management guidelines: Hereditary Breast and Ovarian Cancer syndrome (BRCA1, BRCA2), Odom syndrome (MLH1, MSH2, MSH6, PMS2, EPCAM), Hereditary Diffuse Gastric Cancer (CDH1), Cowden syndrome (PTEN), Li Fraumeni syndrome (TP53), Peutz-Jeghers syndrome (STK11), MUTYH Associated Polyposis (MUTYH), and Neurofibromatosis type 1 (NF1).  Risk-reducing salpingo-oophorectomy can be considered in women with mutations in BRIP1, RAD51C, or RAD51D. Breast and/or other cancer risk management guidelines are available for LUCY, CHEK2, PALB2, NF1, and NBN. The remaining genes (BARD1, DICER1, MRE11A, RAD50, and SMARCA4) are associated with increased cancer risk and may allow us to make medical recommendations when mutations are identified.      Ellen was provided with a detailed brochure from [employer] explaining the OvaNext testing.    Medical Management: For Ellen, we reviewed that the information from genetic testing may determine:    additional cancer screening for which Ellen may qualify (i.e. more frequent colonoscopies, more frequent dermatologic exams,  etc.),    options for risk reducing surgeries Ellen could consider (i.e. surgery to remove her ovaries and/or uterus, etc.),      and targeted chemotherapies or indicated surgeries for Ellen if she were to develop certain cancers in the future (i.e. immunotherapy for individuals with Odom syndrome, PARP inhibitors, etc.).     These recommendations and possible targeted chemotherapies will be discussed in detail once genetic testing is completed.     Plan:  1) Today, Ellen elected to proceed with an OvaNext genetic testing panel.  Therefore, consent was reviewed and signed for this genetic testing.  Her blood was then drawn for this analysis.  2) These genetic testing results should be available within 4-6 weeks.  3) Ellen plans to either return to clinic or make a telephone results visit to discuss the results.    Luci Lowe MS, [facility]  Genetic Counselor  Ph: [phone]    Face to face time: 55 minutes

## 2019-03-22 LAB — MISCELLANEOUS TEST: NORMAL

## 2019-04-17 ENCOUNTER — ONCOLOGY VISIT (OUTPATIENT)
Dept: ONCOLOGY | Facility: CLINIC | Age: 74
End: 2019-04-17
Attending: GENETIC COUNSELOR, MS
Payer: MEDICARE

## 2019-04-17 DIAGNOSIS — Z80.41 FAMILY HISTORY OF MALIGNANT NEOPLASM OF OVARY: ICD-10-CM

## 2019-04-17 DIAGNOSIS — C50.912 BILATERAL MALIGNANT NEOPLASM OF BREAST IN FEMALE, UNSPECIFIED ESTROGEN RECEPTOR STATUS, UNSPECIFIED SITE OF BREAST (H): Primary | ICD-10-CM

## 2019-04-17 DIAGNOSIS — C50.911 BILATERAL MALIGNANT NEOPLASM OF BREAST IN FEMALE, UNSPECIFIED ESTROGEN RECEPTOR STATUS, UNSPECIFIED SITE OF BREAST (H): Primary | ICD-10-CM

## 2019-04-17 DIAGNOSIS — Z80.3 FAMILY HISTORY OF MALIGNANT NEOPLASM OF BREAST: ICD-10-CM

## 2019-04-17 PROCEDURE — 40000072 ZZH STATISTIC GENETIC COUNSELING, < 16 MIN: Performed by: GENETIC COUNSELOR, MS

## 2019-04-17 NOTE — PATIENT INSTRUCTIONS
Negative Genetic Test Result    Genetic Testing  You had a blood test that looked at the genetic information in one or more genes associated with increased cancer risk.  The testing looked for any harmful changes that would stop this particular gene from working like it should. If an individual does not have any harmful changes or variants of unknown significance found from their blood test, their genetic test result is reported as negative.       Results  The genetic test did not identify any pathogenic (harmful) changes in the genes that were tested. There are several possible explanations for a negative test result. Without knowing the gene mutation in your family, the cause of the cancer in you or your relatives is still unknown. Your genetic counselor can help interpret the result for you and your relatives. In this case, there are several reasons that may explain the negative test result:    There may be a gene mutation in the family that you did not inherit.     You may have a gene mutation in a different gene that was not included in the test, or has not yet been discovered.     The cancers in you or your family may be due to a combination of genetic factors and environment (multifactorial/familial).    The cancers in you or your family may be sporadic/random cancers.    There is very small chance that a mutation was not found by current testing methods.  As testing technology evolves over time, it may still be possible to identify a mutation in a gene that was not found on this test.    It is important to note which genes were included in your test. A list of these genes can be found on your test result.    Screening Recommendations  Due to this negative test result, cancer screening recommendations should be based on your personal and family history. This may include increased cancer screening for you and/or your family members. Your genetic counselor and health care provider can help make  appropriate recommendations.      Please call us if you have any questions or concerns.   Cancer Risk Management Program [phone] ([phone])  ? Marleni Bosch, MS, [facility]  [phone]  ? Luci Lowe, MS, [facility]  [phone]  ? Bianca Farias, MS, [facility]  [phone]  ? Yanna Hess, MS, [facility]  [phone]  ? Erin Banks, MS, [facility] [phone]

## 2019-04-17 NOTE — PROGRESS NOTES
"Cancer Risk Management Program Genetic Counseling Note    4/17/2019    Referring Provider: Dr. Jessica Hinkle    Presenting Information:  Ellen Campos returned to the Cancer Risk Management Program at [facility] in [place] to discuss her genetic testing results. Her blood was drawn on 3-20-19 and an OvaNext genetic testing panel was ordered from [facility]. This testing was done because of her personal history of bilateral breast cancer and her family history of multiple relatives with breast cancer.    Genetic Testing Result: SHANKAR Hughes is negative for mutations in the LUCY, BARD1, BRCA1, BRCA2, BRIP1, CDH1, CHEK2, DICER1, EPCAM, MLH1, MRE11A, MSH2, MSH6, MUTYH, NBN, NF1, PALB2, PMS2, PTEN, RAD50, RAD51C, RAD51D, SMARCA4, STK11, and TP53 genes. No mutations were found in any of the 25 genes analyzed. This test involved sequencing and deletion/duplication analysis of all genes with the exceptions of EPCAM (deletions/duplications only).    Therefore, genetic testing did not detect an identifiable mutation associated with Hereditary Breast and Ovarian Cancer syndrome (BRCA1, BRCA2), Odom syndrome (MLH1, MSH2, MSH6, PMS2, EPCAM), Hereditary Diffuse Gastric Cancer (CDH1), Cowden syndrome (PTEN), Li Fraumeni syndrome (TP53), Peutz-Jeghers syndrome (STK11), MUTYH Associated Polyposis (MUTYH), or Neurofibromatosis type 1 (NF1).    A copy of the test report can be found in the Laboratory tab, dated 3/20/19, and named \"SEND OUTS MISC TEST\". The report is scanned in as a linked document.    Interpretation:  We discussed several different interpretations of this negative test result:    1. One explanation may be that there is a different gene or combination of genes and environment that are associated with the cancers in Ellen and/or her relatives.   2. Another explanation may be that some of her relatives did have a cancer susceptibility gene mutation, and she did not inherit " it.  3. It is possible that Ellen's breast cancers were sporadic and not related to a specific, single gene mutation.  4. There is also a small possibility that there is a mutation in one of these genes, and the testing laboratory could not find it with their current testing methods.       Screening:  Based on this negative test result, it is important for Ellen and her relatives to refer back to the family history for appropriate cancer screening.  We talked about that Ellen should continue to follow-up with her oncology team as previously recommended regarding her breast cancer surveillance/treatment.  We talked about that based on Ellen's negative test results and the information that we have currently about her family, we would not recommend additional increased cancer screening for other cancers (beyond what is recommended for the general population for other types of cancer).    Inheritance:  We reviewed the autosomal dominant inheritance of mutations in most of these cancersusceptability genes. We discussed that given her negative test results, we would expect that Ellen cannot/did not pass on an identifiable mutation in these genes to her children based on this test result. Mutations in these genes do not skip generations.      Additional Testing Considerations:  It is possible Ellen does carry a gene or combination of genes and environment that increased her risk for cancer that was not identifiable through this particular genetic testing panel. Ellen is encouraged to contact me in the future if she wants to know if there is additional genetic testing that might be available/indicated for her in or if she wishes to readdress larger gene panel options in the future.     Although Ellen's genetic testing result was negative, other relatives may still carry a gene mutation associated with an increased risk for breast (or other) cancer. Genetic counseling is recommended for her maternal  "relatives to discuss genetic testing options.  If any of these relatives do pursue genetic testing, Ellen is encouraged to contact me so that we may review the impact of their test results on Ellen.    Summary:  We do not have an explanation for Ellen's bilateral breast cancer. Because of that, it is important that she continue with cancer screening based on her personal and family history as discussed above.    Genetic testing is rapidly advancing, and new cancer susceptibility genes will most likely be identified in the future. Therefore, I encouraged Ellen to contact me annually or if there are changes in her personal or family history. This may change how we assess her cancer risk, screening, and the testing we would offer.    Plan:  1.  I provided Ellen with a copy of her test results today and a handout summarizing negative genetic test results (see after visit summary).    2. She plans to follow-up with her oncology care team regarding her continuing breast cancer surveillance/treatment.  She also plans to follow-up with her primary care provider about general population screening recommendations for other types of cancer.    3. She should contact me annually, or sooner if her family history changes and she would like to know if there are additional screening or testing recommendations at that time.    4.  We did briefly review again today, Ellen's personal history of an aortic valve replacement and of a son that  of complications of hypoplastic left heart.  We talked about that there is evidence reported in the scientific literature that some families seem to have a predisposition for left-sided heart defects (such as biscuspid aortic valve, hypoplastic left heart, and coarctation of the aorta).  For these families, there seems to be a stronger genetic risk factor than is typical for the \"multifactorial inheritance\" associated with most heart defects.  Because of this, it is usually " recommended that first degree relatives of individuals with these types of heart defects have an echocardiogram (eg. Ellen's two other children and Ellen's living siblings).  We talked about that what the specific genetic risk factors are for families that seem to have this predisposition is not well known at this time.      If Ellen has any further questions, I encouraged her to contact me at [phone].    Luci Lowe MS, [facility]  Genetic Counselor  Ph: [phone]    Time spent face to face: 15 minutes

## 2019-04-17 NOTE — LETTER
Cancer Risk Management  Program Locations    [facility]  Mailing Address  Cancer Risk Management Program  [facility]  [address]  [place] [address]    New patient appointments  [phone]  April 22, 2019    Ellen Campos  [address]  [place] [address]      Dear Ellen,    It was a pleasure meeting with you again at [facility] in [place] on 4-17-19.  Here is a copy of the progress note from your recent genetic counseling visit to the Cancer Risk Management Program.  If you have any additional questions, please feel free to call.    Cancer Risk Management Program Genetic Counseling Note    4/17/2019    Referring Provider: Dr. Jessica Hinkle    Presenting Information:  Ellen Campos returned to the Cancer Risk Management Program at [facility] in [place] to discuss her genetic testing results. Her blood was drawn on 3-20-19 and an OvaNext genetic testing panel was ordered from [facility]. This testing was done because of her personal history of bilateral breast cancer and her family history of multiple relatives with breast cancer.    Genetic Testing Result: NEGATIVE  Ellen is negative for mutations in the LUCY, BARD1, BRCA1, BRCA2, BRIP1, CDH1, CHEK2, DICER1, EPCAM, MLH1, MRE11A, MSH2, MSH6, MUTYH, NBN, NF1, PALB2, PMS2, PTEN, RAD50, RAD51C, RAD51D, SMARCA4, STK11, and TP53 genes. No mutations were found in any of the 25 genes analyzed. This test involved sequencing and deletion/duplication analysis of all genes with the exceptions of EPCAM (deletions/duplications only).    Therefore, genetic testing did not detect an identifiable mutation associated with Hereditary Breast and Ovarian Cancer syndrome (BRCA1, BRCA2), Odom  "syndrome (MLH1, MSH2, MSH6, PMS2, EPCAM), Hereditary Diffuse Gastric Cancer (CDH1), Cowden syndrome (PTEN), Li Fraumeni syndrome (TP53), Peutz-Jeghers syndrome (STK11), MUTYH Associated Polyposis (MUTYH), or Neurofibromatosis type 1 (NF1).    A copy of the test report can be found in the Laboratory tab, dated 3/20/19, and named \"[facility]\". The report is scanned in as a linked document.    Interpretation:  We discussed several different interpretations of this negative test result:    1. One explanation may be that there is a different gene or combination of genes and environment that are associated with the cancers in Ellen and/or her relatives.   2. Another explanation may be that some of her relatives did have a cancer susceptibility gene mutation, and she did not inherit it.  3. It is possible that Stevens breast cancers were sporadic and not related to a specific, single gene mutation.  4. There is also a small possibility that there is a mutation in one of these genes, and the testing laboratory could not find it with their current testing methods.       Screening:  Based on this negative test result, it is important for Ellen and her relatives to refer back to the family history for appropriate cancer screening.  We talked about that Ellen should continue to follow-up with her oncology team as previously recommended regarding her breast cancer surveillance/treatment.  We talked about that based on Ellen's negative test results and the information that we have currently about her family, we would not recommend additional increased cancer screening for other cancers (beyond what is recommended for the general population for other types of cancer).    Inheritance:  We reviewed the autosomal dominant inheritance of mutations in most of these cancersusceptability genes. We discussed that given her negative test results, we would expect that Ellen cannot/did not pass on an identifiable " mutation in these genes to her children based on this test result. Mutations in these genes do not skip generations.      Additional Testing Considerations:  It is possible Ellen does carry a gene or combination of genes and environment that increased her risk for cancer that was not identifiable through this particular genetic testing panel. Ellen is encouraged to contact me in the future if she wants to know if there is additional genetic testing that might be available/indicated for her in or if she wishes to readdress larger gene panel options in the future.     Although Ellen's genetic testing result was negative, other relatives may still carry a gene mutation associated with an increased risk for breast (or other) cancer. Genetic counseling is recommended for her maternal relatives to discuss genetic testing options.  If any of these relatives do pursue genetic testing, Ellen is encouraged to contact me so that we may review the impact of their test results on Ellen.    Summary:  We do not have an explanation for Ellen's bilateral breast cancer. Because of that, it is important that she continue with cancer screening based on her personal and family history as discussed above.    Genetic testing is rapidly advancing, and new cancer susceptibility genes will most likely be identified in the future. Therefore, I encouraged Ellen to contact me annually or if there are changes in her personal or family history. This may change how we assess her cancer risk, screening, and the testing we would offer.    Plan:  1.  I provided Ellen with a copy of her test results today and a handout summarizing negative genetic test results (see after visit summary).    2. She plans to follow-up with her oncology care team regarding her continuing breast cancer surveillance/treatment.  She also plans to follow-up with her primary care provider about general population screening recommendations for other  "types of cancer.    3. She should contact me annually, or sooner if her family history changes and she would like to know if there are additional screening or testing recommendations at that time.    4.  We did briefly review again today, Ellen's personal history of an aortic valve replacement and of a son that  of complications of hypoplastic left heart.  We talked about that there is evidence reported in the scientific literature that some families seem to have a predisposition for left-sided heart defects (such as biscuspid aortic valve, hypoplastic left heart, and coarctation of the aorta).  For these families, there seems to be a stronger genetic risk factor than is typical for the \"multifactorial inheritance\" associated with most heart defects.  Because of this, it is usually recommended that first degree relatives of individuals with these types of heart defects have an echocardiogram (eg. Ellen's two other children and Ellen's living siblings).  We talked about that what the specific genetic risk factors are for families that seem to have this predisposition is not well known at this time.      If Ellen has any further questions, I encouraged her to contact me at [phone].    Luci Lowe MS, [facility]  Genetic Counselor  Ph: [phone]    Time spent face to face: 15 minutes                              "

## 2019-04-19 LAB — LAB SCANNED RESULT: NORMAL

## 2019-06-20 ENCOUNTER — HOSPITAL ENCOUNTER (OUTPATIENT)
Dept: ULTRASOUND IMAGING | Facility: CLINIC | Age: 74
Discharge: HOME | End: 2019-06-20
Attending: INTERNAL MEDICINE | Admitting: INTERNAL MEDICINE
Payer: MEDICARE

## 2019-06-20 ENCOUNTER — ONCOLOGY VISIT (OUTPATIENT)
Dept: ONCOLOGY | Facility: CLINIC | Age: 74
End: 2019-06-20
Attending: INTERNAL MEDICINE
Payer: MEDICARE

## 2019-06-20 ENCOUNTER — HOSPITAL ENCOUNTER (OUTPATIENT)
Facility: CLINIC | Age: 74
Setting detail: SPECIMEN
End: 2019-06-20
Attending: INTERNAL MEDICINE
Payer: MEDICARE

## 2019-06-20 VITALS
BODY MASS INDEX: 22.18 KG/M2 | SYSTOLIC BLOOD PRESSURE: 131 MMHG | RESPIRATION RATE: 16 BRPM | HEART RATE: 71 BPM | TEMPERATURE: 98.7 F | HEIGHT: 66 IN | OXYGEN SATURATION: 97 % | DIASTOLIC BLOOD PRESSURE: 72 MMHG | WEIGHT: 138 LBS

## 2019-06-20 DIAGNOSIS — C50.911 BILATERAL MALIGNANT NEOPLASM OF BREAST IN FEMALE, UNSPECIFIED ESTROGEN RECEPTOR STATUS, UNSPECIFIED SITE OF BREAST (H): ICD-10-CM

## 2019-06-20 DIAGNOSIS — C50.912 BILATERAL MALIGNANT NEOPLASM OF BREAST IN FEMALE, UNSPECIFIED ESTROGEN RECEPTOR STATUS, UNSPECIFIED SITE OF BREAST (H): Primary | ICD-10-CM

## 2019-06-20 DIAGNOSIS — C50.911 BILATERAL MALIGNANT NEOPLASM OF BREAST IN FEMALE, UNSPECIFIED ESTROGEN RECEPTOR STATUS, UNSPECIFIED SITE OF BREAST (H): Primary | ICD-10-CM

## 2019-06-20 DIAGNOSIS — C50.912 BILATERAL MALIGNANT NEOPLASM OF BREAST IN FEMALE, UNSPECIFIED ESTROGEN RECEPTOR STATUS, UNSPECIFIED SITE OF BREAST (H): ICD-10-CM

## 2019-06-20 LAB
ALBUMIN SERPL-MCNC: 3 G/DL (ref 3.4–5)
ALP SERPL-CCNC: 194 U/L (ref 40–150)
ALT SERPL W P-5'-P-CCNC: 18 U/L (ref 0–50)
ANION GAP SERPL CALCULATED.3IONS-SCNC: 5 MMOL/L (ref 3–14)
AST SERPL W P-5'-P-CCNC: 29 U/L (ref 0–45)
BILIRUB SERPL-MCNC: 0.3 MG/DL (ref 0.2–1.3)
BUN SERPL-MCNC: 44 MG/DL (ref 7–30)
CALCIUM SERPL-MCNC: 9 MG/DL (ref 8.5–10.1)
CHLORIDE SERPL-SCNC: 103 MMOL/L (ref 94–109)
CO2 SERPL-SCNC: 25 MMOL/L (ref 20–32)
CREAT SERPL-MCNC: 1.05 MG/DL (ref 0.52–1.04)
ERYTHROCYTE [DISTWIDTH] IN BLOOD BY AUTOMATED COUNT: 14.1 % (ref 10–15)
GFR SERPL CREATININE-BSD FRML MDRD: 53 ML/MIN/{1.73_M2}
GLUCOSE SERPL-MCNC: 103 MG/DL (ref 70–99)
HCT VFR BLD AUTO: 35.2 % (ref 35–47)
HGB BLD-MCNC: 11.1 G/DL (ref 11.7–15.7)
MCH RBC QN AUTO: 27.7 PG (ref 26.5–33)
MCHC RBC AUTO-ENTMCNC: 31.5 G/DL (ref 31.5–36.5)
MCV RBC AUTO: 88 FL (ref 78–100)
PLATELET # BLD AUTO: 247 10E9/L (ref 150–450)
POTASSIUM SERPL-SCNC: 5.4 MMOL/L (ref 3.4–5.3)
PROT SERPL-MCNC: 8.2 G/DL (ref 6.8–8.8)
RBC # BLD AUTO: 4.01 10E12/L (ref 3.8–5.2)
SODIUM SERPL-SCNC: 133 MMOL/L (ref 133–144)
WBC # BLD AUTO: 7.3 10E9/L (ref 4–11)

## 2019-06-20 PROCEDURE — 80053 COMPREHEN METABOLIC PANEL: CPT | Performed by: INTERNAL MEDICINE

## 2019-06-20 PROCEDURE — 86300 IMMUNOASSAY TUMOR CA 15-3: CPT | Performed by: INTERNAL MEDICINE

## 2019-06-20 PROCEDURE — 85027 COMPLETE CBC AUTOMATED: CPT | Performed by: INTERNAL MEDICINE

## 2019-06-20 PROCEDURE — 76604 US EXAM CHEST: CPT

## 2019-06-20 PROCEDURE — G0463 HOSPITAL OUTPT CLINIC VISIT: HCPCS

## 2019-06-20 PROCEDURE — 99214 OFFICE O/P EST MOD 30 MIN: CPT | Performed by: INTERNAL MEDICINE

## 2019-06-20 ASSESSMENT — MIFFLIN-ST. JEOR: SCORE: 1148.74

## 2019-06-20 ASSESSMENT — PAIN SCALES - GENERAL: PAINLEVEL: MILD PAIN (3)

## 2019-06-20 NOTE — PROGRESS NOTES
Visit Date:   2019      Ellen is a 73-year-old patient who comes in today for interim followup.  She has a diagnosis of bilateral breast cancer.  She had a right-sided breast cancer in  and a left-sided breast cancer in .  For the one in , she is on active treatment with adjuvant Arimidex, and she is going to stop the treatment this summer.  That will be 10 years on the hormonal therapy.  The most recent one on the left side was a T2 N0 infiltrating ductal carcinoma in the upper outer quadrant of the left breast.  Today, she comes into clinic for interim followup.  She is complaining of some lumpiness on the right chest wall in the lower outer quadrant, and she is concerned about that.        We did send her to Genetics for genetic testing, it came back negative.  She just had a sister who was diagnosed with triple-negative breast cancer, and her mother and sister who  in [place] from neglected breast cancer.      Ellen does suffer from some issues with her cardiac health.  She has had an aortic valve replacement and a 2-vessel bypass.  She seems to be doing quite well from a cardiac standpoint.      REVIEW OF SYSTEMS:  A 14-point comprehensive review of systems is otherwise unremarkable.      MEDICATIONS AND ALLERGIES:  Outlined in the nursing records.      PHYSICAL EXAMINATION:   GENERAL:  She is well-appearing lady in no acute distress.   VITAL SIGNS:  Stable.  She is anxious about the right chest wall today.   NECK:  No masses or goiter.  There is no cervical, supraclavicular or infraclavicular adenopathy.   CHEST:  Clear to auscultation and percussion bilaterally.   HEART:  S1, S2 normal.  She a systolic murmur along the left sternal border.   ABDOMEN:  Soft and nontender, no hepatosplenomegaly.   EXTREMITIES:  Legs are without tenderness or edema.   BREASTS:  The patient is status post bilateral mastectomies.  On the right chest wall, she does have some mild lumpiness in the lower  outer quadrant.  No tenderness on the left side.  The left side is negative.  Right and left axilla are negative.      DATA REVIEW:  White cell count 7.3, hemoglobin 11.1, platelets 247,000, creatinine 1.05.      IMPRESSION:  This is a 73-year-old patient with a diagnosis of bilateral breast cancer.  Today, she is complaining of lumpiness on the right chest wall where she had her right-sided mastectomy.  It is in the lower outer quadrant.  I am going to get routine blood work on her today and then also I am going to send her for an ultrasound of the right chest wall for reasons as outlined above.  I will be in touch with her with the results.  Her consultation time today has been 30 minutes, the majority of time was spent on counseling and direction of patient care.         STUART DUQUE MD             D: 2019   T: 2019   MT: ESTRELLA      Name:     RANDA SHANNON   MRN:      [id number]        Account:      [id number]   :      1945           Visit Date:   2019      Document: [id number]

## 2019-06-20 NOTE — NURSING NOTE
"Oncology Rooming Note    June 20, 2019 1:41 PM   Ellen Campos is a 73 year old female who presents for:    Chief Complaint   Patient presents with     Oncology Clinic Visit     Malignant neoplasm of left breast     Initial Vitals: /72   Pulse 71   Temp 98.7  F (37.1  C) (Tympanic)   Resp 16   Ht 1.67 m (5' 5.75\")   Wt 62.6 kg (138 lb)   SpO2 97%   BMI 22.44 kg/m   Estimated body mass index is 22.44 kg/m  as calculated from the following:    Height as of this encounter: 1.67 m (5' 5.75\").    Weight as of this encounter: 62.6 kg (138 lb). Body surface area is 1.7 meters squared.  Mild Pain (3) Comment: Data Unavailable   No LMP recorded.  Allergies reviewed: Yes  Medications reviewed: Yes    Medications: MEDICATION REFILLS NEEDED TODAY. Provider was notified.  Pharmacy name entered into Ellie:    [id number] - [place], [place] - [address]/[facility] #[id number] - [place], [place] - [address] AT [facility]    Clinical concerns: follow up       Cara Salinas CMA              "

## 2019-06-20 NOTE — LETTER
2019         RE: Ellen Campos  [address]  [place] [address]        Dear Colleague,    Thank you for referring your patient, Ellen Campos, to the [facility]. Please see a copy of my visit note below.    Visit Date:   2019      Ellen is a 73-year-old patient who comes in today for interim followup.  She has a diagnosis of bilateral breast cancer.  She had a right-sided breast cancer in  and a left-sided breast cancer in .  For the one in , she is on active treatment with adjuvant Arimidex, and she is going to stop the treatment this summer.  That will be 10 years on the hormonal therapy.  The most recent one on the left side was a T2 N0 infiltrating ductal carcinoma in the upper outer quadrant of the left breast.  Today, she comes into clinic for interim followup.  She is complaining of some lumpiness on the right chest wall in the lower outer quadrant, and she is concerned about that.        We did send her to Genetics for genetic testing, it came back negative.  She just had a sister who was diagnosed with triple-negative breast cancer, and her mother and sister who  in [place] from neglected breast cancer.      Ellen does suffer from some issues with her cardiac health.  She has had an aortic valve replacement and a 2-vessel bypass.  She seems to be doing quite well from a cardiac standpoint.      REVIEW OF SYSTEMS:  A 14-point comprehensive review of systems is otherwise unremarkable.      MEDICATIONS AND ALLERGIES:  Outlined in the nursing records.      PHYSICAL EXAMINATION:   GENERAL:  She is well-appearing lady in no acute distress.   VITAL SIGNS:  Stable.  She is anxious about the right chest wall today.   NECK:  No masses or goiter.  There is no cervical, supraclavicular or infraclavicular adenopathy.   CHEST:  Clear to auscultation and percussion bilaterally.   HEART:  S1, S2 normal.  She a systolic murmur along the left sternal  border.   ABDOMEN:  Soft and nontender, no hepatosplenomegaly.   EXTREMITIES:  Legs are without tenderness or edema.   BREASTS:  The patient is status post bilateral mastectomies.  On the right chest wall, she does have some mild lumpiness in the lower outer quadrant.  No tenderness on the left side.  The left side is negative.  Right and left axilla are negative.      DATA REVIEW:  White cell count 7.3, hemoglobin 11.1, platelets 247,000, creatinine 1.05.      IMPRESSION:  This is a 73-year-old patient with a diagnosis of bilateral breast cancer.  Today, she is complaining of lumpiness on the right chest wall where she had her right-sided mastectomy.  It is in the lower outer quadrant.  I am going to get routine blood work on her today and then also I am going to send her for an ultrasound of the right chest wall for reasons as outlined above.  I will be in touch with her with the results.  Her consultation time today has been 30 minutes, the majority of time was spent on counseling and direction of patient care.         STUART HINKLE MD             D: 2019   T: 2019   MT: WT      Name:     RANDA SHANNON   MRN:      [id number]        Account:      [id number]   :      1945           Visit Date:   2019      Document: [id number]       Again, thank you for allowing me to participate in the care of your patient.        Sincerely,        Stuart Hinkle MD

## 2019-06-21 LAB — CANCER AG27-29 SERPL-ACNC: 26 U/ML (ref 0–39)

## 2019-07-08 DIAGNOSIS — Z17.0 MALIGNANT NEOPLASM OF LEFT BREAST IN FEMALE, ESTROGEN RECEPTOR POSITIVE, UNSPECIFIED SITE OF BREAST (H): ICD-10-CM

## 2019-07-08 DIAGNOSIS — C50.912 MALIGNANT NEOPLASM OF LEFT BREAST IN FEMALE, ESTROGEN RECEPTOR POSITIVE, UNSPECIFIED SITE OF BREAST (H): ICD-10-CM

## 2019-07-08 NOTE — TELEPHONE ENCOUNTER
Pending Prescriptions:                       Disp   Refills    zolpidem (AMBIEN) 5 MG tablet             30 tab*3            Sig: TAKE 1 TABLET BY MOUTH EVERY DAY AS NEEDED FOR           SLEEP         Last Written Prescription Date:  3/13/2019  Last Fill Quantity: 30,   # refills: 3  Last Office Visit: 6/20/2019  Future Office visit: 12/19/2019      Routing refill request to provider for review/approval.  Nehal Ghotra, RN, BSN, OCN

## 2019-07-10 RX ORDER — ZOLPIDEM TARTRATE 5 MG/1
TABLET ORAL
Qty: 30 TABLET | Refills: 3 | Status: SHIPPED | OUTPATIENT
Start: 2019-07-10 | End: 2019-11-13

## 2019-07-10 NOTE — TELEPHONE ENCOUNTER
Signed Prescriptions:                        Disp   Refills    zolpidem (AMBIEN) 5 MG tablet              30 tab*3        Sig: TAKE 1 TABLET BY MOUTH EVERY DAY AS NEEDED FOR SLEEP  Authorizing Provider: STUART HINKLE     Rx has been printed and signed by Dr Hinkle. Rx was faxed to [facility] pharmacy in [place].   Nehal Ghotra, RN, BSN, OCN

## 2019-10-03 DIAGNOSIS — D50.9 IRON DEFICIENCY ANEMIA, UNSPECIFIED IRON DEFICIENCY ANEMIA TYPE: ICD-10-CM

## 2019-10-03 RX ORDER — FERROUS SULFATE 325(65) MG
325 TABLET ORAL 2 TIMES DAILY
Qty: 60 TABLET | Refills: 3 | Status: SHIPPED | OUTPATIENT
Start: 2019-10-03

## 2019-10-03 NOTE — TELEPHONE ENCOUNTER
Pending Prescriptions:                       Disp   Refills    ferrous sulfate (IRON SUPPLEMENT) 325 (65*60 tab*3            Sig: Take 1 tablet (325 mg) by mouth 2 times daily         Last Written Prescription Date:  7/30/2018  Last Fill Quantity: 60,   # refills: 3  Last Office Visit: 6/20/2019  Future Office visit:    Next 5 appointments (look out 90 days)    Dec 19, 2019  2:30 PM CST  Return Visit with Jessica Hinkle MD  [facility] ([facility]) [facility]  [address]  [facility] [address]  [place] [address]  [phone]           Routing refill request to provider for review/approval.  Nehal Ghotra, RN, BSN, OCN

## 2019-11-12 DIAGNOSIS — C50.912 MALIGNANT NEOPLASM OF LEFT BREAST IN FEMALE, ESTROGEN RECEPTOR POSITIVE, UNSPECIFIED SITE OF BREAST (H): ICD-10-CM

## 2019-11-12 DIAGNOSIS — Z17.0 MALIGNANT NEOPLASM OF LEFT BREAST IN FEMALE, ESTROGEN RECEPTOR POSITIVE, UNSPECIFIED SITE OF BREAST (H): ICD-10-CM

## 2019-11-12 NOTE — TELEPHONE ENCOUNTER
Pending Prescriptions:                       Disp   Refills    zolpidem (AMBIEN) 5 MG tablet             30 tab*3            Sig: TAKE 1 TABLET BY MOUTH EVERY DAY AS NEEDED FOR           SLEEP         Last Written Prescription Date:  7/10/2019  Last Fill Quantity: 30,   # refills: 3  Last Office Visit: 6/20/2019  Future Office visit:    Next 5 appointments (look out 90 days)    Dec 19, 2019  2:30 PM CST  Return Visit with Jessica Hinkle MD  [facility] ([facility]) [facility]  [address]  [facility] [address]  [place] [facility] [address]  [phone]           Routing refill request to provider for review/approval.  Nehal Ghotra, RN, BSN, OCN

## 2019-11-13 RX ORDER — ZOLPIDEM TARTRATE 5 MG/1
TABLET ORAL
Qty: 30 TABLET | Refills: 3 | Status: SHIPPED | OUTPATIENT
Start: 2019-11-13

## 2019-11-13 NOTE — TELEPHONE ENCOUNTER
Signed Prescriptions:                        Disp   Refills    zolpidem (AMBIEN) 5 MG tablet              30 tab*3        Sig: TAKE 1 TABLET BY MOUTH EVERY DAY AS NEEDED FOR SLEEP  Authorizing Provider: STUART HINKLE     Rx has been printed and signed by Dr Hinkle. Rx was faxed to [facility] pharmacy.  Nehal Ghotra, RN, BSN, OCN

## 2021-06-01 ENCOUNTER — RECORDS - HEALTHEAST (OUTPATIENT)
Dept: ADMINISTRATIVE | Facility: CLINIC | Age: 76
End: 2021-06-01

## 2024-02-01 NOTE — PROGRESS NOTES
Visit Date:   06/28/2018      INDICATIONS:  Ellen is 72 years old, comes in today for interim followup.  She is here for followup of bilateral breast cancer.  She originally had a right-sided breast cancer in 2003 and then a most recent left-sided breast cancer in 2009.  The left-sided breast cancer was a T2 N0 infiltrating ductal carcinoma in the upper outer quadrant, which was ER/AL positive, and she has done well.  She is now on active treatment with adjuvant Arimidex for the left-sided breast cancer, and she is going to continue that until 2019.  She has had some issues with cardiac health.  She had an aortic valve replaced, and she has also had a 2-vessel bypass.  She comes in today for interim followup.  She is actually looking good today.  She has no cardiac symptomatology.  She denies any cough, shortness of breath, bone pain, any worrisome symptomatology from a breast cancer standpoint.  We have talked about the duration of time of her Arimidex, and we are due to stop it in 2019.  She has no major problems at this point.      REVIEW OF SYSTEMS:  A 14-point comprehensive review of systems is otherwise unremarkable.      MEDICATIONS:  As charted.      ALLERGIES:  AS CHARTED.        PHYSICAL EXAMINATION:   GENERAL:  She is a well-appearing lady in no acute distress.   VITAL SIGNS:  Stable.     HEENT:  Oropharynx clear, mucous membranes moist.   NECK:  Has no masses or goiter.     LYMPHS:  There is no cervical, supraclavicular or infraclavicular adenopathy.   CHEST:  Clear to auscultation and percussion bilaterally.   HEART:  Heart sounds 1, 2 normal.  She has no murmur.  She has an aortic valve.     GASTROINTESTINAL:  Abdomen soft and nontender, no hepatosplenomegaly.   EXTREMITIES:  Legs are without tenderness or edema.   BREASTS:  The patient is status post bilateral mastectomies.  The scars look good.  Right and left axillae are negative.        DATA REVIEWED:  Labs are pending at time of dictation.         IMPRESSION AND PLAN:  A 72-year-old patient with a history of bilateral breast cancer.  She is currently on active treatment with Arimidex.  Her most recent cancer was on the left side, and it was a T2 N0 infiltrating ductal carcinoma of the left breast in the upper outer quadrant, estrogen receptor positive.  She is going to continue on adjuvant Arimidex until 2019.  She is tolerating it well.  Her cardiac status seems stable at this point.  I have drawn routine blood work today, and I am going to see her back in my clinic in 6 months.         STUART DUQUE MD             D: 2018   T: 2018   MT: MINI      Name:     RANDA SHANNON   MRN:      [id number]        Account:      [id number]   :      1945           Visit Date:   2018      Document: [id number]     - - -

## 2025-03-26 ENCOUNTER — TRANSFERRED RECORDS (OUTPATIENT)
Dept: HEALTH INFORMATION MANAGEMENT | Facility: CLINIC | Age: 80
End: 2025-03-26
Payer: MEDICARE